# Patient Record
Sex: FEMALE | Race: WHITE | Employment: UNEMPLOYED | URBAN - METROPOLITAN AREA
[De-identification: names, ages, dates, MRNs, and addresses within clinical notes are randomized per-mention and may not be internally consistent; named-entity substitution may affect disease eponyms.]

---

## 2019-11-05 ENCOUNTER — HOSPITAL ENCOUNTER (OUTPATIENT)
Dept: PREADMISSION TESTING | Age: 59
Discharge: HOME | End: 2019-11-05
Payer: COMMERCIAL

## 2019-11-05 ENCOUNTER — OFFICE VISIT (OUTPATIENT)
Dept: ONCOLOGY | Age: 59
End: 2019-11-05

## 2019-11-05 VITALS
WEIGHT: 171 LBS | SYSTOLIC BLOOD PRESSURE: 130 MMHG | RESPIRATION RATE: 18 BRPM | HEART RATE: 79 BPM | TEMPERATURE: 96.6 F | OXYGEN SATURATION: 96 % | BODY MASS INDEX: 28.49 KG/M2 | HEIGHT: 65 IN | DIASTOLIC BLOOD PRESSURE: 79 MMHG

## 2019-11-05 DIAGNOSIS — R19.00 PELVIC MASS IN FEMALE: ICD-10-CM

## 2019-11-05 DIAGNOSIS — Z01.818 PREOP TESTING: ICD-10-CM

## 2019-11-05 DIAGNOSIS — Z01.818 PREOP TESTING: Primary | ICD-10-CM

## 2019-11-05 LAB
ATRIAL RATE: 82 BPM
CALCULATED P AXIS, ECG09: 47 DEGREES
CALCULATED R AXIS, ECG10: 55 DEGREES
CALCULATED T AXIS, ECG11: 65 DEGREES
DIAGNOSIS, 93000: NORMAL
P-R INTERVAL, ECG05: 162 MS
Q-T INTERVAL, ECG07: 384 MS
QRS DURATION, ECG06: 82 MS
QTC CALCULATION (BEZET), ECG08: 448 MS
VENTRICULAR RATE, ECG03: 82 BPM
XX-LABCORP SPECIMEN COL,LCBCF: NORMAL

## 2019-11-05 PROCEDURE — 86920 COMPATIBILITY TEST SPIN: CPT

## 2019-11-05 PROCEDURE — 86900 BLOOD TYPING SEROLOGIC ABO: CPT

## 2019-11-05 PROCEDURE — 86922 COMPATIBILITY TEST ANTIGLOB: CPT

## 2019-11-05 PROCEDURE — 99001 SPECIMEN HANDLING PT-LAB: CPT

## 2019-11-05 PROCEDURE — 86870 RBC ANTIBODY IDENTIFICATION: CPT

## 2019-11-05 PROCEDURE — 93005 ELECTROCARDIOGRAM TRACING: CPT

## 2019-11-05 PROCEDURE — 86921 COMPATIBILITY TEST INCUBATE: CPT

## 2019-11-05 RX ORDER — CHOLECALCIFEROL TAB 125 MCG (5000 UNIT) 125 MCG
TAB ORAL
COMMUNITY
Start: 2013-03-04

## 2019-11-05 NOTE — PROGRESS NOTES
Shanae Ding is a 62 y.o. female presents in office for    Chief Complaint   Patient presents with    New Patient    Ovarian Cyst     right ovary        Do you have any unusual vaginal bleeding, discharge or irritation? no  Do you have any changes in your bowel movements? no  Have you been experiencing nausea or vomiting? no  Have you been experiencing any continuous or worsening abdominal pain? no  Any urinary burning? no    Visit Vitals  /79 (BP 1 Location: Right arm, BP Patient Position: Sitting)   Pulse 79   Temp 96.6 °F (35.9 °C) (Oral)   Resp 18   Ht 5' 5\" (1.651 m)   Wt 77.6 kg (171 lb)   SpO2 96%   BMI 28.46 kg/m²         Health Maintenance Due   Topic Date Due    Hepatitis C Screening  1960    DTaP/Tdap/Td series (1 - Tdap) 05/11/1979    PAP AKA CERVICAL CYTOLOGY  05/11/1979    Shingrix Vaccine Age 50> (1 of 2) 05/11/2008    BREAST CANCER SCRN MAMMOGRAM  05/11/2008    FOBT Q 1 YEAR AGE 50-75  05/11/2008    Influenza Age 9 to Adult  08/01/2019         1. Have you been to the ER, urgent care clinic since your last visit? Hospitalized since your last visit? No    2. Have you seen or consulted any other health care providers outside of the [address] [place] [address] since your last visit? Include any pap smears or colon screening. Dr. Moisés Forrest (PCP)    3 most recent PHQ Screens 11/5/2019   Little interest or pleasure in doing things Not at all   Feeling down, depressed, irritable, or hopeless Not at all   Total Score PHQ 2 0       Abuse Screening Questionnaire 11/5/2019   Do you ever feel afraid of your partner? N   Are you in a relationship with someone who physically or mentally threatens you? N   Is it safe for you to go home? Y       Fall Risk Assessment, last 12 mths 11/5/2019   Able to walk? Yes   Fall in past 12 months?  No       Learning Assessment 11/5/2019   PRIMARY LEARNER Patient   HIGHEST LEVEL OF EDUCATION - PRIMARY LEARNER  4 YEARS OF COLLEGE   BARRIERS PRIMARY LEARNER NONE   CO-LEARNER CAREGIVER No   PRIMARY LANGUAGE ENGLISH   LEARNER PREFERENCE PRIMARY DEMONSTRATION     READING     LISTENING   ANSWERED BY patient   RELATIONSHIP SELF

## 2019-11-05 NOTE — PROGRESS NOTES
[facility]  [address] [place] [address], [address], [address] [facility]  [address] [facility], [place], [address] [place] [address]  [place], [address]   [phone]  Av Hernandez DO      Patient ID:  Name:  Ev Jacob  MRN:  [id number]  :  1960/59 y.o. Date:  2019      HISTORY OF PRESENT ILLNESS:  Ev Jacob is a 62 y.o.  postmenopausal female referred by Dr. Rachel Ayala for right ovarian mass. CT initially performed for LLQ which she had been having since September. After CT was sent for ultrasound. She is still having left side abdominal pain she describes as menstrual cramps radiating to back. Denies vaginal bleeding. No change in bladder/bowel habits    Labs:  none      Imaging    Pelvic Usn  Findings:    Transabdominal portion:    The uterus was anteverted. The right ovary is likely associated with a large cystic structure. The left ovary was nonvisualized. Transvaginal imaging was then performed to better evaluate both the uterus and adnexal structures. Transvaginal portion:    Uterus: Anteverted. 7.4 x 2.6 x 4.6 cm. Homogeneous myometrium. A hypodense lesion, likely a fibroid, is noted measuring 2.0 x 1.8 x 2.2 cm    Endometrial thickness: 3 mm. Right ovary: 13.1 x 11.4 x 10.2 cm for a calculated volume of 802 mL. Color and spectral doppler analysis reveals normal arterial and venous waveforms. There is a large mildly complex cystic-appearing lesion containing debris measuring 11.0 x 10.7 x 10.6 cm. Left ovary: Not visualized. Adnexal masses: None. Fluid: Physiologic. IMPRESSION    1.  Large mildly complex cystic-appearing lesion in the right adnexa, favored to be associated with the right ovary. Recommend GYN surgical consultation. 2. Uterine fibroid. CT 10/28/2019  Findings:    Lower thorax: Unremarkable    ABDOMEN:     Liver: The liver is not cirrhotic.  No worrisome masses are seen.  The hepatic veins, portal veins, SMV and splenic vein are patent. Gallbladder / biliary: Gallbladder is surgically absent. Pancreas:Normal.    Spleen: The spleen is normal.    Adrenal glands:Normal.    Nadyne Dunk kidneys are orthotopic. No collecting system dilation, high density calculus or large, mass is seen. Lymph nodes: Normal.    Retroperitoneum:No lymph node enlargement or mass. Aorta / IVC:The aorta is normal caliber. Gastrointestinal tract: There is diffuse mucosal thickening primarily involving the greater curvature of the stomach. There is some mucosal thickening is also noted at the antrum and proximal segment of the duodenum. Patient may benefit from endoscopy. The small bowel is optimally opacified with contrast showing normal caliber and valvulae conniventes days. The large bowel is predominantly decompressed and unopacified with oral contrast. There is suggestion of distal colonic bowel wall thickening primarily in the sigmoid segment. . Overall findings are nonspecific however may represent colitis or inflammatory bowel disease. Patient may benefit from GI consultation. Appendix:The appendix is surgically absent. Peritoneum:No free air. Fluid:None    Abdominal wall: Intact. PELVIS:   Bladder: The bladder is normal.    Pelvic reproductive organs:A 13.8 x 13.3 x 10.6 cm well-circumscribed cystic mass directly abuts the superior aspect of the uterus and adnexa. This is likely of ovarian origin however cannot be clearly identified to originate with either the right or left adnexa. Hounsfield units measure approximately 9. There is some wall thickening. Recommend dedicated transvaginal/transabdominal pelvic ultrasound for further assessment. This cystic mass directly effaces the uterus and has mass effect on the adjacent urinary bladder. Delayed imaging demonstrated appropriate excretion of iodinated contrast within the collecting system and urinary bladder.     A well-circumscribed 2.0 x 0.8 cm hyperdense mass is located in the anterior uterine body likely representing a uterine leiomyoma. Patient is status post bilateral tubal ligation clips. .    Lymph nodes: No lymph node enlargement. Fluid:None. Inguinal region: No lymphadenopathy    Musculoskeletal: No lytic or blastic lesion. Other Result Information   Interface, [employer] - 10/28/2019  2:53 PM EDT  Indication: R10.32: Abdominal pain, LLQ. Comparison: None available. Technique: Thin helical images were obtained from the lung bases through the pelvis. Reconstructions in the coronal and sagittal plane were done as well. Automated exposure control adjustment of mA and KV according to patient size were used for this exam.     Contrast: 100 cc IV Omnipaque    Findings:    Lower thorax: Unremarkable    ABDOMEN:     Liver: The liver is not cirrhotic. No worrisome masses are seen. The hepatic veins, portal veins, SMV and splenic vein are patent. Gallbladder / biliary: Gallbladder is surgically absent. Pancreas:Normal.    Spleen: The spleen is normal.    Adrenal glands:Normal.    Cleatrice Castleman kidneys are orthotopic. No collecting system dilation, high density calculus or large, mass is seen. Lymph nodes: Normal.    Retroperitoneum:No lymph node enlargement or mass. Aorta / IVC:The aorta is normal caliber. Gastrointestinal tract: There is diffuse mucosal thickening primarily involving the greater curvature of the stomach. There is some mucosal thickening is also noted at the antrum and proximal segment of the duodenum. Patient may benefit from endoscopy. The small bowel is optimally opacified with contrast showing normal caliber and valvulae conniventes days. The large bowel is predominantly decompressed and unopacified with oral contrast. There is suggestion of distal colonic bowel wall thickening primarily in the sigmoid segment. . Overall findings are nonspecific however may represent colitis or inflammatory bowel disease. Patient may benefit from GI consultation. Appendix:The appendix is surgically absent. Peritoneum:No free air. Fluid:None    Abdominal wall: Intact. PELVIS:   Bladder: The bladder is normal.    Pelvic reproductive organs:A 13.8 x 13.3 x 10.6 cm well-circumscribed cystic mass directly abuts the superior aspect of the uterus and adnexa. This is likely of ovarian origin however cannot be clearly identified to originate with either the right or left adnexa. Hounsfield units measure approximately 9. There is some wall thickening. Recommend dedicated transvaginal/transabdominal pelvic ultrasound for further assessment. This cystic mass directly effaces the uterus and has mass effect on the adjacent urinary bladder. Delayed imaging demonstrated appropriate excretion of iodinated contrast within the collecting system and urinary bladder. A well-circumscribed 2.0 x 0.8 cm hyperdense mass is located in the anterior uterine body likely representing a uterine leiomyoma. Patient is status post bilateral tubal ligation clips. .    Lymph nodes: No lymph node enlargement. Fluid:None. Inguinal region: No lymphadenopathy    Musculoskeletal: No lytic or blastic lesion. IMPRESSION  1. 13.8 x 13.3 x 10.6 cm well-circumscribed cystic mass within the pelvis, highly suspect for ovarian origin. This mass causes direct effacement of the adjacent uterus and subsequent urinary bladder. Transvaginal/transabdominal pelvic ultrasound recommended. 2. Gastric wall mucosal thickening primarily along the greater curvature and to lesser degree antrum and first part of the duodenum. Endoscopy and/or GI consult recommended. 3. Possible colonic mucosal wall thickening specifically within the sigmoid colon. Patient may benefit from endoscopy. Findings may represent nonspecific colitis or inflammatory bowel disease. There is no perforation or bowel obstruction identified.     Signed By: Yolanda Burgess MD on 10/28/2019 2:51 PM            ROS:   As above      There are no active problems to display for this patient. Past Medical History:   Diagnosis Date    Diabetes [facility])       Past Surgical History:   Procedure Laterality Date    HX APPENDECTOMY      HX CHOLECYSTECTOMY      HX TUBAL LIGATION        OB History    None       Social History     Tobacco Use    Smoking status: Current Every Day Smoker    Smokeless tobacco: Never Used   Substance Use Topics    Alcohol use: Not Currently      Family History   Problem Relation Age of Onset    Macular Degen Mother     Thyroid Disease Mother     Other Father         suspected mesothelioma      Current Outpatient Medications   Medication Sig    SITagliptin (JANUVIA) 100 mg tablet Take 100 mg by mouth.  mv-mn/folic ac/calcium/vit K1 (WOMEN'S 50 PLUS MULTIVITAMIN PO) Take  by mouth.  cholecalciferol, VITAMIN D3, (VITAMIN D3) 5,000 unit tab tablet VITAMIN D 5000 UNIT TABS (CHOLECALCIFEROL)     No current facility-administered medications for this visit. Allergies   Allergen Reactions    Codeine Other (comments)    Statins-Hmg-Coa Reductase Inhibitors Other (comments)     Muscle Pain          OBJECTIVE:    Physical Exam  VITAL SIGNS: Visit Vitals  /79 (BP 1 Location: Right arm, BP Patient Position: Sitting)   Pulse 79   Temp 96.6 °F (35.9 °C) (Oral)   Resp 18   Ht 5' 5\" (1.651 m)   Wt 77.6 kg (171 lb)   SpO2 96%   BMI 28.46 kg/m²      GENERAL SHILPI: in no apparent distress and well developed and well nourished   MUSCULOSKEL: no joint tenderness, deformity or swelling   INTEGUMENT:  warm and dry, no rashes or lesions   ABDOMEN . soft, NT, ND, mass palpable in midline   EXTREMITIES: extremities normal, atraumatic, no cyanosis or edema   PELVIC: External genitalia: normal general appearance  Vaginal: normal mucosa without prolapse or lesions  Cervix: normal appearance  Adnexa: enlarged adnexa, left, mobile, no nodularity  Uterus: normal single, nontender   RECTAL: deferred RICHARD SURVEY: Cervical, supraclavicular, axillary and inguinal nodes normal.   NEURO: Grossly normal         IMPRESSION/PLAN:  1. Pelvic mass   -reviewed her imaging and explained likely benign, however, would recommend surgical evaluaiton   -will check    -discussed plan for resection of mass/BSO, hysterectomy with intraop path and staging if malignant   -discussed robotic approach   -Risks, benefits and alternatives of surgery discussed in detail.  Risks including bleeding, infection, blood clot, injury to nearby organs including bladder, bowel, ureters and blood vessels.    -all of ms. garcia's questions/concerns addressed      The total time spent was 60 minutes regarding this patients diagnosis of pelvic mass and >50% of this time was spent counseling and coordinating care    [address] [place] [address]  Gynecologic Oncology  11/5/20191:53 PM

## 2019-11-05 NOTE — PROGRESS NOTES
[address], [address] [facility] Taylor Perez, [address] [facility] 
[address] [place] [address], [address] [place], [address] 
 [phone] Jess Syed DO Patient ID: 
Name:  Cuauhtemoc Calderon MRN:  [id number] :  59 y.o. Date:  2019 HISTORY OF PRESENT ILLNESS: 
Cuauhtemoc Calderon is a 62 y.o. G{NUMBERS 1-10:09842}P{NUMBERS 1-10:80282} {race/ethnicity:46038} {Menopause:25418} female referred by *** for ***. Labs: 
*** Imaging *** 
 
  
ROS:  
As above There are no active problems to display for this patient. Past Medical History:  
Diagnosis Date  Diabetes ([address] Past Surgical History:  
Procedure Laterality Date  HX APPENDECTOMY  HX CHOLECYSTECTOMY  HX TUBAL LIGATION    
  
OB History None Social History Tobacco Use  Smoking status: Current Every Day Smoker  Smokeless tobacco: Never Used Substance Use Topics  Alcohol use: Not Currently Family History Problem Relation Age of Onset  Macular Degen Mother  Thyroid Disease Mother  Other Father   
     suspected mesothelioma Current Outpatient Medications Medication Sig  
 SITagliptin (JANUVIA) 100 mg tablet Take 100 mg by mouth.  mv-mn/folic ac/calcium/vit K1 (WOMEN'S 50 PLUS MULTIVITAMIN PO) Take  by mouth.  cholecalciferol, VITAMIN D3, (VITAMIN D3) 5,000 unit tab tablet VITAMIN D 5000 UNIT TABS (CHOLECALCIFEROL) No current facility-administered medications for this visit. Allergies Allergen Reactions  Codeine Other (comments)  Statins-Hmg-Coa Reductase Inhibitors Other (comments) Muscle Pain OBJECTIVE: 
 
Physical Exam 
VITAL SIGNS: Visit Vitals /79 (BP 1 Location: Right arm, BP Patient Position: Sitting) Pulse 79 Temp 96.6 °F (35.9 °C) (Oral) Resp 18 Ht 5' 5\" (1.651 m) Wt 77.6 kg (171 lb) SpO2 96% BMI 28.46 kg/m² GENERAL SHILPI: in no apparent distress and well developed and well nourished MUSCULOSKEL: no joint tenderness, deformity or swelling INTEGUMENT:  warm and dry, no rashes or lesions ABDOMEN . soft, NT, ND, No masses appreciated EXTREMITIES: extremities normal, atraumatic, no cyanosis or edema PELVIC: {Exam; pelvic:92211} RECTAL: deferred RICHARD SURVEY: Cervical, supraclavicular, axillary and inguinal nodes normal.  
NEURO: Grossly normal  
 
 
 
IMPRESSION/PLAN: 
 
 
The total time spent was 60 minutes regarding this patients diagnosis of *** and >50% of this time was spent counseling and coordinating care Lidya Watkins DO Gynecologic Oncology 11/5/20191:54 PM

## 2019-11-05 NOTE — PATIENT INSTRUCTIONS

## 2019-11-05 NOTE — H&P (VIEW-ONLY)
[address], [address], [address] [facility] 
[address] [facility], [address] [place], [address] 
 [phone] Jake Townsend DO Patient ID: 
Name:  Roscoe Garg MRN:  [id number] :  1960/59 y.o. Date:  2019 HISTORY OF PRESENT ILLNESS: 
Roscoe Garg is a 62 y.o.  postmenopausal female referred by Dr. Susan Gonsales for right ovarian mass. CT initially performed for LLQ which she had been having since September. After CT was sent for ultrasound. She is still having left side abdominal pain she describes as menstrual cramps radiating to back. Denies vaginal bleeding. No change in bladder/bowel habits Labs: 
none Imaging Pelvic Usn Findings: 
 
Transabdominal portion: The uterus was anteverted. The right ovary is likely associated with a large cystic structure. The left ovary was nonvisualized. Transvaginal imaging was then performed to better evaluate both the uterus and adnexal structures. Transvaginal portion: 
 
Uterus: Anteverted. 7.4 x 2.6 x 4.6 cm. Homogeneous myometrium. A hypodense lesion, likely a fibroid, is noted measuring 2.0 x 1.8 x 2.2 cm Endometrial thickness: 3 mm. Right ovary: 13.1 x 11.4 x 10.2 cm for a calculated volume of 802 mL. Color and spectral doppler analysis reveals normal arterial and venous waveforms. There is a large mildly complex cystic-appearing lesion containing debris measuring 11.0 x 10.7 x 10.6 cm. Left ovary: Not visualized. Adnexal masses: None. Fluid: Physiologic. IMPRESSION 1.  Large mildly complex cystic-appearing lesion in the right adnexa, favored to be associated with the right ovary. Recommend GYN surgical consultation. 2. Uterine fibroid. CT 10/28/2019 Findings: 
 
Lower thorax: Unremarkable ABDOMEN:  
 
Liver: The liver is not cirrhotic.  No worrisome masses are seen.  The hepatic veins, portal veins, SMV and splenic vein are patent. Gallbladder / biliary: Gallbladder is surgically absent. Pancreas:Normal. 
 
Spleen: The spleen is normal. 
 
Adrenal glands:Normal. 
 
Madina Marianela kidneys are orthotopic. No collecting system dilation, high density calculus or large, mass is seen. Lymph nodes: Normal. 
 
Retroperitoneum:No lymph node enlargement or mass. Aorta / IVC:The aorta is normal caliber. Gastrointestinal tract: There is diffuse mucosal thickening primarily involving the greater curvature of the stomach. There is some mucosal thickening is also noted at the antrum and proximal segment of the duodenum. Patient may benefit from endoscopy. The small bowel is optimally opacified with contrast showing normal caliber and valvulae conniventes days. The large bowel is predominantly decompressed and unopacified with oral contrast. There is suggestion of distal colonic bowel wall thickening primarily in the sigmoid segment. . Overall findings are nonspecific however may represent colitis or inflammatory bowel disease. Patient may benefit from GI consultation. Appendix:The appendix is surgically absent. Peritoneum:No free air. Fluid:None Abdominal wall: Intact. PELVIS:  
Bladder: The bladder is normal. 
 
Pelvic reproductive organs:A 13.8 x 13.3 x 10.6 cm well-circumscribed cystic mass directly abuts the superior aspect of the uterus and adnexa. This is likely of ovarian origin however cannot be clearly identified to originate with either the right or left adnexa. Hounsfield units measure approximately 9. There is some wall thickening. Recommend dedicated transvaginal/transabdominal pelvic ultrasound for further assessment. This cystic mass directly effaces the uterus and has mass effect on the adjacent urinary bladder. Delayed imaging demonstrated appropriate excretion of iodinated contrast within the collecting system and urinary bladder. A well-circumscribed 2.0 x 0.8 cm hyperdense mass is located in the anterior uterine body likely representing a uterine leiomyoma. Patient is status post bilateral tubal ligation clips. Ramone Ashley Lymph nodes: No lymph node enlargement. Fluid:None. Inguinal region: No lymphadenopathy Musculoskeletal: No lytic or blastic lesion. Other Result Information Interface, [facility]is [facility] - 10/28/2019  2:53 PM EDT Indication: R10.32: Abdominal pain, LLQ. Comparison: None available. Technique: Thin helical images were obtained from the lung bases through the pelvis. Reconstructions in the coronal and sagittal plane were done as well. Automated exposure control adjustment of mA and KV according to patient size were used for this exam.  
 
Contrast: 100 cc IV Omnipaque Findings: 
 
Lower thorax: Unremarkable ABDOMEN:  
 
Liver: The liver is not cirrhotic. No worrisome masses are seen. The hepatic veins, portal veins, SMV and splenic vein are patent. Gallbladder / biliary: Gallbladder is surgically absent. Pancreas:Normal. 
 
Spleen: The spleen is normal. 
 
Adrenal glands:Normal. 
 
Nicholas Garrett kidneys are orthotopic. No collecting system dilation, high density calculus or large, mass is seen. Lymph nodes: Normal. 
 
Retroperitoneum:No lymph node enlargement or mass. Aorta / IVC:The aorta is normal caliber. Gastrointestinal tract: There is diffuse mucosal thickening primarily involving the greater curvature of the stomach. There is some mucosal thickening is also noted at the antrum and proximal segment of the duodenum. Patient may benefit from endoscopy. The small bowel is optimally opacified with contrast showing normal caliber and valvulae conniventes days. The large bowel is predominantly decompressed and unopacified with oral contrast. There is suggestion of distal colonic bowel wall thickening primarily in the sigmoid segment. . Overall findings are nonspecific however may represent colitis or inflammatory bowel disease. Patient may benefit from GI consultation. Appendix:The appendix is surgically absent. Peritoneum:No free air. Fluid:None Abdominal wall: Intact. PELVIS:  
Bladder: The bladder is normal. 
 
Pelvic reproductive organs:A 13.8 x 13.3 x 10.6 cm well-circumscribed cystic mass directly abuts the superior aspect of the uterus and adnexa. This is likely of ovarian origin however cannot be clearly identified to originate with either the right or left adnexa. Hounsfield units measure approximately 9. There is some wall thickening. Recommend dedicated transvaginal/transabdominal pelvic ultrasound for further assessment. This cystic mass directly effaces the uterus and has mass effect on the adjacent urinary bladder. Delayed imaging demonstrated appropriate excretion of iodinated contrast within the collecting system and urinary bladder. A well-circumscribed 2.0 x 0.8 cm hyperdense mass is located in the anterior uterine body likely representing a uterine leiomyoma. Patient is status post bilateral tubal ligation clips. Oj Ask Lymph nodes: No lymph node enlargement. Fluid:None. Inguinal region: No lymphadenopathy Musculoskeletal: No lytic or blastic lesion. IMPRESSION 1. 13.8 x 13.3 x 10.6 cm well-circumscribed cystic mass within the pelvis, highly suspect for ovarian origin. This mass causes direct effacement of the adjacent uterus and subsequent urinary bladder. Transvaginal/transabdominal pelvic ultrasound recommended. 2. Gastric wall mucosal thickening primarily along the greater curvature and to lesser degree antrum and first part of the duodenum. Endoscopy and/or GI consult recommended. 3. Possible colonic mucosal wall thickening specifically within the sigmoid colon. Patient may benefit from endoscopy. Findings may represent nonspecific colitis or inflammatory bowel disease. There is no perforation or bowel obstruction identified. Signed By: Rudolph Burciaga MD on 10/28/2019 2:51 PM  
 
 
 
  
ROS:  
As above There are no active problems to display for this patient. Past Medical History:  
Diagnosis Date  Diabetes ([address] Past Surgical History:  
Procedure Laterality Date  HX APPENDECTOMY  HX CHOLECYSTECTOMY  HX TUBAL LIGATION    
  
OB History None Social History Tobacco Use  Smoking status: Current Every Day Smoker  Smokeless tobacco: Never Used Substance Use Topics  Alcohol use: Not Currently Family History Problem Relation Age of Onset  Macular Degen Mother  Thyroid Disease Mother  Other Father   
     suspected mesothelioma Current Outpatient Medications Medication Sig  
 SITagliptin (JANUVIA) 100 mg tablet Take 100 mg by mouth.  mv-mn/folic ac/calcium/vit K1 (WOMEN'S 50 PLUS MULTIVITAMIN PO) Take  by mouth.  cholecalciferol, VITAMIN D3, (VITAMIN D3) 5,000 unit tab tablet VITAMIN D 5000 UNIT TABS (CHOLECALCIFEROL) No current facility-administered medications for this visit. Allergies Allergen Reactions  Codeine Other (comments)  Statins-Hmg-Coa Reductase Inhibitors Other (comments) Muscle Pain OBJECTIVE: 
 
Physical Exam 
VITAL SIGNS: Visit Vitals /79 (BP 1 Location: Right arm, BP Patient Position: Sitting) Pulse 79 Temp 96.6 °F (35.9 °C) (Oral) Resp 18 Ht 5' 5\" (1.651 m) Wt 77.6 kg (171 lb) SpO2 96% BMI 28.46 kg/m² GENERAL SHILPI: in no apparent distress and well developed and well nourished MUSCULOSKEL: no joint tenderness, deformity or swelling INTEGUMENT:  warm and dry, no rashes or lesions ABDOMEN . soft, NT, ND, mass palpable in midline EXTREMITIES: extremities normal, atraumatic, no cyanosis or edema PELVIC: External genitalia: normal general appearance Vaginal: normal mucosa without prolapse or lesions Cervix: normal appearance Adnexa: enlarged adnexa, left, mobile, no nodularity Uterus: normal single, nontender RECTAL: deferred RICHARD SURVEY: Cervical, supraclavicular, axillary and inguinal nodes normal.  
NEURO: Grossly normal  
 
 
 
IMPRESSION/PLAN: 
1. Pelvic mass 
 -reviewed her imaging and explained likely benign, however, would recommend surgical evaluaiton 
 -will check  
 -discussed plan for resection of mass/BSO, hysterectomy with intraop path and staging if malignant 
 -discussed robotic approach 
 -Risks, benefits and alternatives of surgery discussed in detail. Risks including bleeding, infection, blood clot, injury to nearby organs including bladder, bowel, ureters and blood vessels.  
 -all of ms. garcia's questions/concerns addressed The total time spent was 60 minutes regarding this patients diagnosis of pelvic mass and >50% of this time was spent counseling and coordinating care Jackson Luong DO Gynecologic Oncology 11/5/20191:53 PM

## 2019-11-06 LAB
ALBUMIN SERPL-MCNC: 4.3 G/DL (ref 3.6–4.8)
ALBUMIN/GLOB SERPL: 1.7 {RATIO} (ref 1.2–2.2)
ALP SERPL-CCNC: 72 IU/L (ref 39–117)
ALT SERPL-CCNC: 16 IU/L (ref 0–32)
AST SERPL-CCNC: 17 IU/L (ref 0–40)
BASOPHILS # BLD AUTO: 0.1 X10E3/UL (ref 0–0.2)
BASOPHILS NFR BLD AUTO: 1 %
BILIRUB SERPL-MCNC: 0.3 MG/DL (ref 0–1.2)
BUN SERPL-MCNC: 8 MG/DL (ref 8–27)
BUN/CREAT SERPL: 11 (ref 12–28)
CALCIUM SERPL-MCNC: 9.9 MG/DL (ref 8.7–10.3)
CHLORIDE SERPL-SCNC: 100 MMOL/L (ref 96–106)
CO2 SERPL-SCNC: 25 MMOL/L (ref 20–29)
CREAT SERPL-MCNC: 0.7 MG/DL (ref 0.57–1)
EOSINOPHIL # BLD AUTO: 0.2 X10E3/UL (ref 0–0.4)
EOSINOPHIL NFR BLD AUTO: 2 %
ERYTHROCYTE [DISTWIDTH] IN BLOOD BY AUTOMATED COUNT: 12.9 % (ref 12.3–15.4)
GLOBULIN SER CALC-MCNC: 2.6 G/DL (ref 1.5–4.5)
GLUCOSE SERPL-MCNC: 158 MG/DL (ref 65–99)
HCT VFR BLD AUTO: 45.2 % (ref 34–46.6)
HGB BLD-MCNC: 15.6 G/DL (ref 11.1–15.9)
IMM GRANULOCYTES # BLD AUTO: 0 X10E3/UL (ref 0–0.1)
IMM GRANULOCYTES NFR BLD AUTO: 0 %
LYMPHOCYTES # BLD AUTO: 3.1 X10E3/UL (ref 0.7–3.1)
LYMPHOCYTES NFR BLD AUTO: 38 %
MCH RBC QN AUTO: 29.8 PG (ref 26.6–33)
MCHC RBC AUTO-ENTMCNC: 34.5 G/DL (ref 31.5–35.7)
MCV RBC AUTO: 86 FL (ref 79–97)
MONOCYTES # BLD AUTO: 0.6 X10E3/UL (ref 0.1–0.9)
MONOCYTES NFR BLD AUTO: 7 %
NEUTROPHILS # BLD AUTO: 4.2 X10E3/UL (ref 1.4–7)
NEUTROPHILS NFR BLD AUTO: 52 %
PLATELET # BLD AUTO: 304 X10E3/UL (ref 150–450)
POTASSIUM SERPL-SCNC: 4.6 MMOL/L (ref 3.5–5.2)
PROT SERPL-MCNC: 6.9 G/DL (ref 6–8.5)
RBC # BLD AUTO: 5.23 X10E6/UL (ref 3.77–5.28)
SODIUM SERPL-SCNC: 139 MMOL/L (ref 134–144)
SPECIMEN STATUS REPORT, ROLRST: NORMAL
WBC # BLD AUTO: 8.1 X10E3/UL (ref 3.4–10.8)

## 2019-11-08 ENCOUNTER — ANESTHESIA EVENT (OUTPATIENT)
Dept: SURGERY | Age: 59
End: 2019-11-08
Payer: COMMERCIAL

## 2019-11-11 ENCOUNTER — ANESTHESIA (OUTPATIENT)
Dept: SURGERY | Age: 59
End: 2019-11-11
Payer: COMMERCIAL

## 2019-11-11 ENCOUNTER — HOSPITAL ENCOUNTER (OUTPATIENT)
Age: 59
Setting detail: OBSERVATION
Discharge: HOME | End: 2019-11-12
Attending: OBSTETRICS & GYNECOLOGY | Admitting: OBSTETRICS & GYNECOLOGY
Payer: COMMERCIAL

## 2019-11-11 DIAGNOSIS — Z90.710 S/P HYSTERECTOMY: Primary | ICD-10-CM

## 2019-11-11 PROBLEM — R19.00 PELVIC MASS IN FEMALE: Status: ACTIVE | Noted: 2019-11-11

## 2019-11-11 LAB
GLUCOSE BLD STRIP.AUTO-MCNC: 163 MG/DL (ref 70–110)
GLUCOSE BLD STRIP.AUTO-MCNC: 181 MG/DL (ref 70–110)
GLUCOSE BLD STRIP.AUTO-MCNC: 239 MG/DL (ref 70–110)
GLUCOSE BLD STRIP.AUTO-MCNC: 242 MG/DL (ref 70–110)

## 2019-11-11 PROCEDURE — 74011000250 HC RX REV CODE- 250: Performed by: NURSE ANESTHETIST, CERTIFIED REGISTERED

## 2019-11-11 PROCEDURE — 88307 TISSUE EXAM BY PATHOLOGIST: CPT

## 2019-11-11 PROCEDURE — 77030008574 HC TBNG SUC IRR STRY -B: Performed by: OBSTETRICS & GYNECOLOGY

## 2019-11-11 PROCEDURE — 77030020782 HC GWN BAIR PAWS FLX 3M -B: Performed by: OBSTETRICS & GYNECOLOGY

## 2019-11-11 PROCEDURE — 76060000033 HC ANESTHESIA 1 TO 1.5 HR: Performed by: OBSTETRICS & GYNECOLOGY

## 2019-11-11 PROCEDURE — 77030035277 HC OBTRTR BLDELSS DISP INTU -B: Performed by: OBSTETRICS & GYNECOLOGY

## 2019-11-11 PROCEDURE — 77030014063 HC TIP MANIP UTER COOP -B: Performed by: OBSTETRICS & GYNECOLOGY

## 2019-11-11 PROCEDURE — 77030020255 HC SOL INJ LR 1000ML BG: Performed by: OBSTETRICS & GYNECOLOGY

## 2019-11-11 PROCEDURE — 77030020703 HC SEAL CANN DISP INTU -B: Performed by: OBSTETRICS & GYNECOLOGY

## 2019-11-11 PROCEDURE — 77030002966 HC SUT PDS J&J -A: Performed by: OBSTETRICS & GYNECOLOGY

## 2019-11-11 PROCEDURE — 99218 HC RM OBSERVATION: CPT

## 2019-11-11 PROCEDURE — 77030031492 HC PRT ACC BLNT AIRSEAL CNMD -B: Performed by: OBSTETRICS & GYNECOLOGY

## 2019-11-11 PROCEDURE — 77030002933 HC SUT MCRYL J&J -A: Performed by: OBSTETRICS & GYNECOLOGY

## 2019-11-11 PROCEDURE — 74011250637 HC RX REV CODE- 250/637: Performed by: NURSE ANESTHETIST, CERTIFIED REGISTERED

## 2019-11-11 PROCEDURE — 74011250636 HC RX REV CODE- 250/636: Performed by: OBSTETRICS & GYNECOLOGY

## 2019-11-11 PROCEDURE — 77030025805 HC MANIP UTER RUMI COOP -B: Performed by: OBSTETRICS & GYNECOLOGY

## 2019-11-11 PROCEDURE — 76210000063 HC OR PH I REC FIRST 0.5 HR: Performed by: OBSTETRICS & GYNECOLOGY

## 2019-11-11 PROCEDURE — 76010000934 HC OR TIME 1 TO 1.5HR INTENSV - TIER 2: Performed by: OBSTETRICS & GYNECOLOGY

## 2019-11-11 PROCEDURE — 74011250636 HC RX REV CODE- 250/636: Performed by: NURSE ANESTHETIST, CERTIFIED REGISTERED

## 2019-11-11 PROCEDURE — 77030018836 HC SOL IRR NACL ICUM -A: Performed by: OBSTETRICS & GYNECOLOGY

## 2019-11-11 PROCEDURE — 77030040830 HC CATH URETH FOL MDII -A: Performed by: OBSTETRICS & GYNECOLOGY

## 2019-11-11 PROCEDURE — 77030037892: Performed by: OBSTETRICS & GYNECOLOGY

## 2019-11-11 PROCEDURE — 77030016151 HC PROTCTR LNS DFOG COVD -B: Performed by: OBSTETRICS & GYNECOLOGY

## 2019-11-11 PROCEDURE — 74011636637 HC RX REV CODE- 636/637: Performed by: OBSTETRICS & GYNECOLOGY

## 2019-11-11 PROCEDURE — 88305 TISSUE EXAM BY PATHOLOGIST: CPT

## 2019-11-11 PROCEDURE — 82962 GLUCOSE BLOOD TEST: CPT

## 2019-11-11 PROCEDURE — 77030018823 HC SLV COMPR VENO -B: Performed by: OBSTETRICS & GYNECOLOGY

## 2019-11-11 PROCEDURE — 77010033678 HC OXYGEN DAILY

## 2019-11-11 PROCEDURE — 88112 CYTOPATH CELL ENHANCE TECH: CPT

## 2019-11-11 PROCEDURE — 77030040361 HC SLV COMPR DVT MDII -B: Performed by: OBSTETRICS & GYNECOLOGY

## 2019-11-11 PROCEDURE — 74011000250 HC RX REV CODE- 250: Performed by: OBSTETRICS & GYNECOLOGY

## 2019-11-11 PROCEDURE — 74011636637 HC RX REV CODE- 636/637: Performed by: SPECIALIST

## 2019-11-11 PROCEDURE — C9290 INJ, BUPIVACAINE LIPOSOME: HCPCS | Performed by: OBSTETRICS & GYNECOLOGY

## 2019-11-11 RX ORDER — MAGNESIUM SULFATE 100 %
4 CRYSTALS MISCELLANEOUS AS NEEDED
Status: DISCONTINUED | OUTPATIENT
Start: 2019-11-11 | End: 2019-11-12 | Stop reason: HOSPADM

## 2019-11-11 RX ORDER — FENTANYL CITRATE 50 UG/ML
25 INJECTION, SOLUTION INTRAMUSCULAR; INTRAVENOUS
Status: DISCONTINUED | OUTPATIENT
Start: 2019-11-11 | End: 2019-11-11 | Stop reason: HOSPADM

## 2019-11-11 RX ORDER — ONDANSETRON 2 MG/ML
INJECTION INTRAMUSCULAR; INTRAVENOUS AS NEEDED
Status: DISCONTINUED | OUTPATIENT
Start: 2019-11-11 | End: 2019-11-11 | Stop reason: HOSPADM

## 2019-11-11 RX ORDER — SODIUM CHLORIDE 0.9 % (FLUSH) 0.9 %
5-40 SYRINGE (ML) INJECTION AS NEEDED
Status: DISCONTINUED | OUTPATIENT
Start: 2019-11-11 | End: 2019-11-12 | Stop reason: HOSPADM

## 2019-11-11 RX ORDER — SODIUM CHLORIDE, SODIUM LACTATE, POTASSIUM CHLORIDE, CALCIUM CHLORIDE 600; 310; 30; 20 MG/100ML; MG/100ML; MG/100ML; MG/100ML
INJECTION, SOLUTION INTRAVENOUS
Status: DISCONTINUED | OUTPATIENT
Start: 2019-11-11 | End: 2019-11-11 | Stop reason: HOSPADM

## 2019-11-11 RX ORDER — ONDANSETRON 2 MG/ML
4 INJECTION INTRAMUSCULAR; INTRAVENOUS
Status: DISCONTINUED | OUTPATIENT
Start: 2019-11-11 | End: 2019-11-12 | Stop reason: HOSPADM

## 2019-11-11 RX ORDER — NALOXONE HYDROCHLORIDE 0.4 MG/ML
0.2 INJECTION, SOLUTION INTRAMUSCULAR; INTRAVENOUS; SUBCUTANEOUS AS NEEDED
Status: DISCONTINUED | OUTPATIENT
Start: 2019-11-11 | End: 2019-11-11 | Stop reason: HOSPADM

## 2019-11-11 RX ORDER — INSULIN LISPRO 100 [IU]/ML
INJECTION, SOLUTION INTRAVENOUS; SUBCUTANEOUS ONCE
Status: COMPLETED | OUTPATIENT
Start: 2019-11-11 | End: 2019-11-11

## 2019-11-11 RX ORDER — NEOSTIGMINE METHYLSULFATE 1 MG/ML
INJECTION, SOLUTION INTRAVENOUS AS NEEDED
Status: DISCONTINUED | OUTPATIENT
Start: 2019-11-11 | End: 2019-11-11 | Stop reason: HOSPADM

## 2019-11-11 RX ORDER — ALBUTEROL SULFATE 90 UG/1
AEROSOL, METERED RESPIRATORY (INHALATION) AS NEEDED
Status: DISCONTINUED | OUTPATIENT
Start: 2019-11-11 | End: 2019-11-11 | Stop reason: HOSPADM

## 2019-11-11 RX ORDER — SODIUM CHLORIDE 0.9 % (FLUSH) 0.9 %
5-40 SYRINGE (ML) INJECTION EVERY 8 HOURS
Status: DISCONTINUED | OUTPATIENT
Start: 2019-11-11 | End: 2019-11-11 | Stop reason: HOSPADM

## 2019-11-11 RX ORDER — PROPOFOL 10 MG/ML
INJECTION, EMULSION INTRAVENOUS AS NEEDED
Status: DISCONTINUED | OUTPATIENT
Start: 2019-11-11 | End: 2019-11-11 | Stop reason: HOSPADM

## 2019-11-11 RX ORDER — SODIUM CHLORIDE 0.9 % (FLUSH) 0.9 %
5-40 SYRINGE (ML) INJECTION AS NEEDED
Status: DISCONTINUED | OUTPATIENT
Start: 2019-11-11 | End: 2019-11-11 | Stop reason: HOSPADM

## 2019-11-11 RX ORDER — FENTANYL CITRATE 50 UG/ML
25 INJECTION, SOLUTION INTRAMUSCULAR; INTRAVENOUS AS NEEDED
Status: DISCONTINUED | OUTPATIENT
Start: 2019-11-11 | End: 2019-11-11 | Stop reason: HOSPADM

## 2019-11-11 RX ORDER — FENTANYL CITRATE 50 UG/ML
INJECTION, SOLUTION INTRAMUSCULAR; INTRAVENOUS AS NEEDED
Status: DISCONTINUED | OUTPATIENT
Start: 2019-11-11 | End: 2019-11-11 | Stop reason: HOSPADM

## 2019-11-11 RX ORDER — HYDROMORPHONE HYDROCHLORIDE 2 MG/ML
INJECTION, SOLUTION INTRAMUSCULAR; INTRAVENOUS; SUBCUTANEOUS AS NEEDED
Status: DISCONTINUED | OUTPATIENT
Start: 2019-11-11 | End: 2019-11-11 | Stop reason: HOSPADM

## 2019-11-11 RX ORDER — CEFAZOLIN SODIUM/WATER 2 G/20 ML
2 SYRINGE (ML) INTRAVENOUS EVERY 8 HOURS
Status: COMPLETED | OUTPATIENT
Start: 2019-11-11 | End: 2019-11-12

## 2019-11-11 RX ORDER — ACETAMINOPHEN 10 MG/ML
INJECTION, SOLUTION INTRAVENOUS AS NEEDED
Status: DISCONTINUED | OUTPATIENT
Start: 2019-11-11 | End: 2019-11-11 | Stop reason: HOSPADM

## 2019-11-11 RX ORDER — GLYCOPYRROLATE 0.2 MG/ML
INJECTION INTRAMUSCULAR; INTRAVENOUS AS NEEDED
Status: DISCONTINUED | OUTPATIENT
Start: 2019-11-11 | End: 2019-11-11 | Stop reason: HOSPADM

## 2019-11-11 RX ORDER — KETOROLAC TROMETHAMINE 15 MG/ML
15 INJECTION, SOLUTION INTRAMUSCULAR; INTRAVENOUS
Status: DISCONTINUED | OUTPATIENT
Start: 2019-11-11 | End: 2019-11-12 | Stop reason: HOSPADM

## 2019-11-11 RX ORDER — LIDOCAINE HYDROCHLORIDE 20 MG/ML
INJECTION, SOLUTION EPIDURAL; INFILTRATION; INTRACAUDAL; PERINEURAL AS NEEDED
Status: DISCONTINUED | OUTPATIENT
Start: 2019-11-11 | End: 2019-11-11 | Stop reason: HOSPADM

## 2019-11-11 RX ORDER — MIDAZOLAM HYDROCHLORIDE 1 MG/ML
INJECTION, SOLUTION INTRAMUSCULAR; INTRAVENOUS AS NEEDED
Status: DISCONTINUED | OUTPATIENT
Start: 2019-11-11 | End: 2019-11-11 | Stop reason: HOSPADM

## 2019-11-11 RX ORDER — SODIUM CHLORIDE, SODIUM LACTATE, POTASSIUM CHLORIDE, CALCIUM CHLORIDE 600; 310; 30; 20 MG/100ML; MG/100ML; MG/100ML; MG/100ML
125 INJECTION, SOLUTION INTRAVENOUS CONTINUOUS
Status: DISCONTINUED | OUTPATIENT
Start: 2019-11-11 | End: 2019-11-12 | Stop reason: HOSPADM

## 2019-11-11 RX ORDER — SODIUM CHLORIDE, SODIUM LACTATE, POTASSIUM CHLORIDE, CALCIUM CHLORIDE 600; 310; 30; 20 MG/100ML; MG/100ML; MG/100ML; MG/100ML
100 INJECTION, SOLUTION INTRAVENOUS CONTINUOUS
Status: DISCONTINUED | OUTPATIENT
Start: 2019-11-11 | End: 2019-11-11 | Stop reason: HOSPADM

## 2019-11-11 RX ORDER — ENOXAPARIN SODIUM 100 MG/ML
40 INJECTION SUBCUTANEOUS EVERY 24 HOURS
Status: DISCONTINUED | OUTPATIENT
Start: 2019-11-12 | End: 2019-11-12 | Stop reason: HOSPADM

## 2019-11-11 RX ORDER — DEXTROSE MONOHYDRATE 100 MG/ML
125-250 INJECTION, SOLUTION INTRAVENOUS AS NEEDED
Status: DISCONTINUED | OUTPATIENT
Start: 2019-11-11 | End: 2019-11-12 | Stop reason: HOSPADM

## 2019-11-11 RX ORDER — ROCURONIUM BROMIDE 10 MG/ML
INJECTION, SOLUTION INTRAVENOUS AS NEEDED
Status: DISCONTINUED | OUTPATIENT
Start: 2019-11-11 | End: 2019-11-11 | Stop reason: HOSPADM

## 2019-11-11 RX ORDER — INSULIN LISPRO 100 [IU]/ML
INJECTION, SOLUTION INTRAVENOUS; SUBCUTANEOUS
Status: DISCONTINUED | OUTPATIENT
Start: 2019-11-11 | End: 2019-11-12 | Stop reason: HOSPADM

## 2019-11-11 RX ORDER — OXYCODONE AND ACETAMINOPHEN 5; 325 MG/1; MG/1
2 TABLET ORAL
Status: DISCONTINUED | OUTPATIENT
Start: 2019-11-11 | End: 2019-11-12 | Stop reason: HOSPADM

## 2019-11-11 RX ORDER — SODIUM CHLORIDE 0.9 % (FLUSH) 0.9 %
5-40 SYRINGE (ML) INJECTION EVERY 8 HOURS
Status: DISCONTINUED | OUTPATIENT
Start: 2019-11-11 | End: 2019-11-12 | Stop reason: HOSPADM

## 2019-11-11 RX ORDER — OXYCODONE AND ACETAMINOPHEN 5; 325 MG/1; MG/1
1 TABLET ORAL
Status: DISCONTINUED | OUTPATIENT
Start: 2019-11-11 | End: 2019-11-12 | Stop reason: HOSPADM

## 2019-11-11 RX ORDER — MAGNESIUM SULFATE 100 %
4 CRYSTALS MISCELLANEOUS AS NEEDED
Status: DISCONTINUED | OUTPATIENT
Start: 2019-11-11 | End: 2019-11-11

## 2019-11-11 RX ORDER — DIPHENHYDRAMINE HCL 25 MG
25 CAPSULE ORAL
Status: DISCONTINUED | OUTPATIENT
Start: 2019-11-11 | End: 2019-11-12 | Stop reason: HOSPADM

## 2019-11-11 RX ORDER — NALOXONE HYDROCHLORIDE 0.4 MG/ML
0.4 INJECTION, SOLUTION INTRAMUSCULAR; INTRAVENOUS; SUBCUTANEOUS AS NEEDED
Status: DISCONTINUED | OUTPATIENT
Start: 2019-11-11 | End: 2019-11-12 | Stop reason: HOSPADM

## 2019-11-11 RX ORDER — DEXTROSE MONOHYDRATE 100 MG/ML
125-250 INJECTION, SOLUTION INTRAVENOUS AS NEEDED
Status: DISCONTINUED | OUTPATIENT
Start: 2019-11-11 | End: 2019-11-11

## 2019-11-11 RX ORDER — CEFAZOLIN SODIUM/WATER 2 G/20 ML
2 SYRINGE (ML) INTRAVENOUS ONCE
Status: COMPLETED | OUTPATIENT
Start: 2019-11-11 | End: 2019-11-11

## 2019-11-11 RX ADMIN — ACETAMINOPHEN 1000 MG: 10 INJECTION, SOLUTION INTRAVENOUS at 12:05

## 2019-11-11 RX ADMIN — ALBUTEROL SULFATE 3 PUFF: 90 AEROSOL, METERED RESPIRATORY (INHALATION) at 13:06

## 2019-11-11 RX ADMIN — FENTANYL CITRATE 75 MCG: 50 INJECTION, SOLUTION INTRAMUSCULAR; INTRAVENOUS at 11:57

## 2019-11-11 RX ADMIN — INSULIN LISPRO 4 UNITS: 100 INJECTION, SOLUTION INTRAVENOUS; SUBCUTANEOUS at 16:27

## 2019-11-11 RX ADMIN — SODIUM CHLORIDE, SODIUM LACTATE, POTASSIUM CHLORIDE, AND CALCIUM CHLORIDE 125 ML/HR: 600; 310; 30; 20 INJECTION, SOLUTION INTRAVENOUS at 10:29

## 2019-11-11 RX ADMIN — SODIUM CHLORIDE, SODIUM LACTATE, POTASSIUM CHLORIDE, AND CALCIUM CHLORIDE: 600; 310; 30; 20 INJECTION, SOLUTION INTRAVENOUS at 11:40

## 2019-11-11 RX ADMIN — Medication 1 MG: at 13:08

## 2019-11-11 RX ADMIN — Medication 2 G: at 20:14

## 2019-11-11 RX ADMIN — Medication 50 MG: at 11:59

## 2019-11-11 RX ADMIN — PROPOFOL 160 MG: 10 INJECTION, EMULSION INTRAVENOUS at 11:59

## 2019-11-11 RX ADMIN — Medication 2 MG: at 13:06

## 2019-11-11 RX ADMIN — INSULIN LISPRO 3 UNITS: 100 INJECTION, SOLUTION INTRAVENOUS; SUBCUTANEOUS at 10:27

## 2019-11-11 RX ADMIN — Medication 2 G: at 12:04

## 2019-11-11 RX ADMIN — Medication 10 ML: at 16:28

## 2019-11-11 RX ADMIN — FENTANYL CITRATE 25 MCG: 50 INJECTION, SOLUTION INTRAMUSCULAR; INTRAVENOUS at 12:16

## 2019-11-11 RX ADMIN — ONDANSETRON HYDROCHLORIDE 4 MG: 2 INJECTION INTRAMUSCULAR; INTRAVENOUS at 12:56

## 2019-11-11 RX ADMIN — LIDOCAINE HYDROCHLORIDE 80 MG: 20 INJECTION, SOLUTION EPIDURAL; INFILTRATION; INTRACAUDAL; PERINEURAL at 11:59

## 2019-11-11 RX ADMIN — SODIUM CHLORIDE, SODIUM LACTATE, POTASSIUM CHLORIDE, AND CALCIUM CHLORIDE 125 ML/HR: 600; 310; 30; 20 INJECTION, SOLUTION INTRAVENOUS at 14:33

## 2019-11-11 RX ADMIN — HYDROMORPHONE HYDROCHLORIDE 0.5 MG: 2 INJECTION, SOLUTION INTRAMUSCULAR; INTRAVENOUS; SUBCUTANEOUS at 12:42

## 2019-11-11 RX ADMIN — GLYCOPYRROLATE 0.4 MG: 0.2 INJECTION INTRAMUSCULAR; INTRAVENOUS at 13:06

## 2019-11-11 RX ADMIN — MIDAZOLAM 2 MG: 1 INJECTION INTRAMUSCULAR; INTRAVENOUS at 11:50

## 2019-11-11 RX ADMIN — ALBUTEROL SULFATE 3 PUFF: 90 AEROSOL, METERED RESPIRATORY (INHALATION) at 12:27

## 2019-11-11 RX ADMIN — HYDROMORPHONE HYDROCHLORIDE 0.5 MG: 2 INJECTION, SOLUTION INTRAMUSCULAR; INTRAVENOUS; SUBCUTANEOUS at 12:29

## 2019-11-11 NOTE — ANESTHESIA POSTPROCEDURE EVALUATION
Post-Anesthesia Evaluation and Assessment    Cardiovascular Function/Vital Signs  Visit Vitals  /49   Pulse 65   Temp 37 °C (98.6 °F)   Resp 15   Ht 5' 6\" (1.676 m)   Wt 76.7 kg (169 lb 3 oz)   SpO2 98%   BMI 27.31 kg/m²       Patient is status post Procedure(s):  ROBOTIC TOTAL LAPAROSCOPIC HYSTERECTOMY,BILATERAL SALPINGO-OOPHORECTOMY,. Nausea/Vomiting: Controlled. Postoperative hydration reviewed and adequate. Pain:  Pain Scale 1: Numeric (0 - 10) (11/11/19 1340)  Pain Intensity 1: 0 (11/11/19 1340)   Managed. Neurological Status:   Neuro (WDL): Within Defined Limits (11/11/19 1340)   At baseline. Mental Status and Level of Consciousness: Arousable. Pulmonary Status:   O2 Device: Nasal cannula (11/11/19 1340)   Adequate oxygenation and airway patent. Complications related to anesthesia: None    Post-anesthesia assessment completed. No concerns. Patient has met all discharge requirements.     Signed By: Joby Pearl CRNA    November 11, 2019

## 2019-11-11 NOTE — PROGRESS NOTES
Pt transported to room 325. Manisha Coronado is at bedside. Pt skin assessment performed and nothing new noted at this time. Pt dressings CDI att his time. Pt NAD at this time.      Visit Vitals  BP (P) 111/61   Pulse (P) 62   Temp (P) 97.4 °F (36.3 °C)   Resp (P) 17   Ht 5' 6\" (1.676 m)   Wt 76.7 kg (169 lb 3 oz)   SpO2 (P) 97%   BMI 27.31 kg/m²     Date 11/10/19 0700 - 11/11/19 0659(Not Admitted) 11/11/19 0700 - 11/12/19 0659   Shift 5677-1287 3913-8199 24 Hour Total 0391-8000 1327-0764 24 Hour Total   INTAKE   I.V.    1600  1600     Volume (lactated Ringers infusion)    800  800     Volume (lactated Ringers infusion)    800  800   Shift Total(mL/kg)    1600(20.8)  1600(20.8)   OUTPUT   Urine    200  200     Urine Output    100  100     Urine Output (mL) (Urinary Catheter 11/11/19 Batista)    100  100   Blood    50  50     Estimated Blood Loss    50  50   Shift Total(mL/kg)    250(3.3)  250(3.3)   NET    1350  1350   Weight (kg)    76.7 76.7 76.7

## 2019-11-11 NOTE — ANESTHESIA PREPROCEDURE EVALUATION
Relevant Problems   No relevant active problems       Anesthetic History   No history of anesthetic complications            Review of Systems / Medical History  Patient summary reviewed, nursing notes reviewed and pertinent labs reviewed    Pulmonary          Smoker         Neuro/Psych   Within defined limits           Cardiovascular  Within defined limits                     GI/Hepatic/Renal  Within defined limits              Endo/Other    Diabetes: poorly controlled         Other Findings   Comments: Last a1c on 09/23/19 = 9.5           Physical Exam    Airway  Mallampati: II  TM Distance: 4 - 6 cm  Neck ROM: normal range of motion   Mouth opening: Normal     Cardiovascular    Rhythm: regular  Rate: normal         Dental    Dentition: Caps/crowns     Pulmonary  Breath sounds clear to auscultation               Abdominal  GI exam deferred       Other Findings            Anesthetic Plan    ASA: 3  Anesthesia type: general          Induction: Intravenous  Anesthetic plan and risks discussed with: Patient and Family

## 2019-11-11 NOTE — INTERVAL H&P NOTE
H&P Update:History and physical has been reviewed. The patient has been examined. There have been no significant clinical changes since the completion of the originally dated History and Physical.Vickie Vaughan was seen and examined. History and physical has been reviewed. The patient has been examined.  There have been no significant clinical changes since the completion of the originally dated History and Physical.

## 2019-11-11 NOTE — PERIOP NOTES
TRANSFER - OUT REPORT:    Verbal report given to Lou Dixon RN (name) on Hardy Poplin  being transferred to [address] [place] [address](unit) for routine post - op       Report consisted of patients Situation, Background, Assessment and   Recommendations(SBAR). Information from the following report(s) SBAR, Intake/Output and MAR was reviewed with the receiving nurse. Lines:   Peripheral IV 11/11/19 Right Forearm (Active)   Site Assessment Clean, dry, & intact 11/11/2019  1:20 PM   Phlebitis Assessment 0 11/11/2019  1:20 PM   Infiltration Assessment 0 11/11/2019  1:20 PM   Dressing Status Clean, dry, & intact 11/11/2019  1:20 PM   Dressing Type Transparent;Tape 11/11/2019  1:20 PM   Hub Color/Line Status Green; Infusing 11/11/2019  1:20 PM       Peripheral IV 11/11/19 Left Forearm (Active)   Site Assessment Clean, dry, & intact 11/11/2019  1:20 PM   Phlebitis Assessment 0 11/11/2019  1:20 PM   Infiltration Assessment 0 11/11/2019  1:20 PM   Dressing Status Clean, dry, & intact 11/11/2019  1:20 PM   Dressing Type Transparent;Tape 11/11/2019  1:20 PM   Hub Color/Line Status Pink; Infusing 11/11/2019  1:20 PM        Opportunity for questions and clarification was provided.       Patient transported with:   O2 @ 2 liters  Registered Nurse  [facility]

## 2019-11-11 NOTE — BRIEF OP NOTE
BRIEF OPERATIVE NOTE    Date of Procedure: 11/11/2019   Preoperative Diagnosis: PELVIC MASS  Postoperative Diagnosis: PELVIC MASS    Procedure(s):  ROBOTIC TOTAL LAPAROSCOPIC HYSTERECTOMY,BILATERAL SALPINGO-OOPHORECTOMY,REsection of pelvic mass. Surgeon(s) and Role:     * Za Arcos MD - Primary         Surgical Assistant: Umu Pritchett    Surgical Staff:  Circ-1: Prudence Espino RN  Circ-Relief: Jaymie Hawkins RN  Scrub Tech-1: Mart Lovell  Surg Asst-1: Frannie VALENTE  Event Time In Time Out   Incision Start 1215    Incision Close       Anesthesia: General   Estimated Blood Loss: 50  Specimens:   ID Type Source Tests Collected by Time Destination   1 : UTERUS, CERVIX, RIGHT TUBE & OVARY Preservative Uterus  Za Arcos MD 11/11/2019 1246 Pathology   2 : LEFT TUBE & OVARY Frozen Section Ovary  Za Arcos MD 11/11/2019 1249 Pathology   1 : Abdominal washings Fresh Abdomen  Za Arcos MD 11/11/2019 1227 Cytology      Findings: 14 cm smooth walled cystic mass replacing left ovary.  Normal uterus, right tube/ovary   Complications: none  Implants: * No implants in log *

## 2019-11-11 NOTE — PROGRESS NOTES
Transition of Care (NICOL) Plan:    Physician follow up    Chart reviewed. Pt admitted for an elective surgical procedure (ROBOTIC TOTAL LAPAROSCOPIC HYSTERECTOMY,BILATERAL SALPINGO-OOPHORECTOMY). Pt is independent. Please encourage ambulation. No transition of care needs identified at this time. Anticipate pt will be medically stable for discharge within the next 24-48 hours with physician follow up. CM available to assist as needed. NICOL Transportation:   How is patient being transported at discharge? Family/Friend      When? Once cleared by physician     Is transport scheduled? N/A      Follow-up appointment and transportation:   PCP/Specialist?  See AVS for Appointment         Who is transporting to the follow-up appointment? Self/Family/Friend      Is transport for follow up appointment scheduled? N/A    Communication plan (with patient/family): Who is being called? Patient or Next of Kin? Responsible party? Patient      What number(s) is to be used? See Facesheet      What service provider is calling for [facility] services? When are they calling? Readmission Risk? (Green/Low; Yellow/Moderate; Red/High):  Green    Care Management Interventions  PCP Verified by CM: Yes  Mode of Transport at Discharge:  Other (see comment)(Family/Friend)  Transition of Care Consult (CM Consult): Discharge Planning  Health Maintenance Reviewed: Yes  Current Support Network: Family Lives Nearby  Confirm Follow Up Transport: Self  Discharge Location  Discharge Placement: Home with family assistance

## 2019-11-12 VITALS
BODY MASS INDEX: 27.19 KG/M2 | OXYGEN SATURATION: 98 % | DIASTOLIC BLOOD PRESSURE: 59 MMHG | HEART RATE: 61 BPM | HEIGHT: 66 IN | SYSTOLIC BLOOD PRESSURE: 124 MMHG | WEIGHT: 169.19 LBS | TEMPERATURE: 97.8 F | RESPIRATION RATE: 16 BRPM

## 2019-11-12 LAB
ANION GAP SERPL CALC-SCNC: 6 MMOL/L (ref 3–18)
BASOPHILS # BLD: 0 K/UL (ref 0–0.1)
BASOPHILS NFR BLD: 0 % (ref 0–2)
BUN SERPL-MCNC: 8 MG/DL (ref 7–18)
BUN/CREAT SERPL: 11 (ref 12–20)
CALCIUM SERPL-MCNC: 8.3 MG/DL (ref 8.5–10.1)
CHLORIDE SERPL-SCNC: 107 MMOL/L (ref 100–111)
CO2 SERPL-SCNC: 28 MMOL/L (ref 21–32)
CREAT SERPL-MCNC: 0.7 MG/DL (ref 0.6–1.3)
DIFFERENTIAL METHOD BLD: NORMAL
EOSINOPHIL # BLD: 0.2 K/UL (ref 0–0.4)
EOSINOPHIL NFR BLD: 2 % (ref 0–5)
ERYTHROCYTE [DISTWIDTH] IN BLOOD BY AUTOMATED COUNT: 13.4 % (ref 11.6–14.5)
GLUCOSE BLD STRIP.AUTO-MCNC: 176 MG/DL (ref 70–110)
GLUCOSE SERPL-MCNC: 150 MG/DL (ref 74–99)
HCT VFR BLD AUTO: 37.4 % (ref 35–45)
HGB BLD-MCNC: 12.5 G/DL (ref 12–16)
LYMPHOCYTES # BLD: 3 K/UL (ref 0.9–3.6)
LYMPHOCYTES NFR BLD: 33 % (ref 21–52)
MCH RBC QN AUTO: 29.1 PG (ref 24–34)
MCHC RBC AUTO-ENTMCNC: 33.4 G/DL (ref 31–37)
MCV RBC AUTO: 87 FL (ref 74–97)
MONOCYTES # BLD: 0.7 K/UL (ref 0.05–1.2)
MONOCYTES NFR BLD: 8 % (ref 3–10)
NEUTS SEG # BLD: 5.2 K/UL (ref 1.8–8)
NEUTS SEG NFR BLD: 57 % (ref 40–73)
PLATELET # BLD AUTO: 219 K/UL (ref 135–420)
PMV BLD AUTO: 10.3 FL (ref 9.2–11.8)
POTASSIUM SERPL-SCNC: 3.8 MMOL/L (ref 3.5–5.5)
RBC # BLD AUTO: 4.3 M/UL (ref 4.2–5.3)
SODIUM SERPL-SCNC: 141 MMOL/L (ref 136–145)
WBC # BLD AUTO: 9.1 K/UL (ref 4.6–13.2)

## 2019-11-12 PROCEDURE — 80048 BASIC METABOLIC PNL TOTAL CA: CPT

## 2019-11-12 PROCEDURE — 85025 COMPLETE CBC W/AUTO DIFF WBC: CPT

## 2019-11-12 PROCEDURE — 36415 COLL VENOUS BLD VENIPUNCTURE: CPT

## 2019-11-12 PROCEDURE — 82962 GLUCOSE BLOOD TEST: CPT

## 2019-11-12 PROCEDURE — 74011636637 HC RX REV CODE- 636/637: Performed by: OBSTETRICS & GYNECOLOGY

## 2019-11-12 PROCEDURE — 99218 HC RM OBSERVATION: CPT

## 2019-11-12 PROCEDURE — 74011250636 HC RX REV CODE- 250/636: Performed by: OBSTETRICS & GYNECOLOGY

## 2019-11-12 RX ORDER — IBUPROFEN 800 MG/1
800 TABLET ORAL
Qty: 60 TAB | Refills: 1 | Status: SHIPPED | OUTPATIENT
Start: 2019-11-12

## 2019-11-12 RX ADMIN — Medication 10 ML: at 00:14

## 2019-11-12 RX ADMIN — KETOROLAC TROMETHAMINE 15 MG: 15 INJECTION, SOLUTION INTRAMUSCULAR; INTRAVENOUS at 09:13

## 2019-11-12 RX ADMIN — INSULIN LISPRO 2 UNITS: 100 INJECTION, SOLUTION INTRAVENOUS; SUBCUTANEOUS at 00:11

## 2019-11-12 RX ADMIN — Medication 10 ML: at 08:36

## 2019-11-12 RX ADMIN — INSULIN LISPRO 2 UNITS: 100 INJECTION, SOLUTION INTRAVENOUS; SUBCUTANEOUS at 08:33

## 2019-11-12 RX ADMIN — ENOXAPARIN SODIUM 40 MG: 40 INJECTION, SOLUTION INTRAVENOUS; SUBCUTANEOUS at 08:34

## 2019-11-12 RX ADMIN — Medication 2 G: at 04:15

## 2019-11-12 NOTE — OP NOTES
[facility]  OPERATIVE REPORT    Name:  Ken Sanchez  MR#:   [id number]  :  1960  ACCOUNT #:  [de-identified]  DATE OF SERVICE:  2019    PREOPERATIVE DIAGNOSIS:  Pelvic mass. POSTOPERATIVE DIAGNOSIS:  Pelvic mass. PROCEDURE PERFORMED:  Robotic-assisted total laparoscopic hysterectomy, bilateral salpingo-oophorectomy, and resection of pelvic mass. SURGEON:  Mary Sutherland DO.    ASSISTANT:  Corona Kumar. ASSISTANT TASK:  Retraction, closing, and uterine manipulation. ANESTHESIA:  General.    FLUIDS:  1600. ESTIMATED BLOOD LOSS:  50. URINE OUTPUT:  100 clear urine. COMPLICATIONS:  None. IMPLANTS:  None. SPECIMENS REMOVED:  Left tube and ovary, uterus, cervix. Right tube and ovary washings. FINDINGS:  Laparoscopic findings revealed a 13- to 15-cm smooth walled cystic lesion arising from left adnexa. Slightly enlarged fibroid uterus and normal tube and ovary on the right. All peritoneal surfaces were smooth. INDICATION:  The patient is a 49-year-old who had been having left lower quadrant abdominal pain. She had a CT scan done and an ultrasound revealing a large pelvic mass. She presents today for definitive surgical management. PROCEDURE:  The patient was taken to the operating room where general anesthesia was obtained without difficulty. She was placed in dorsal lithotomy position, prepped and draped in normal sterile fashion. A surgical time-out was taken by entire surgical team.  A Batista catheter was placed. A sterile speculum was then placed in the patient's vagina. The anterior lip of the cervix was grasped with a single-tooth tenaculum. The cervix was then dilated. An 8-cm BRANDY tip with a 3.5-cm Mark Raring was advanced without difficulty. Attention was then turned to the abdomen where a Veress needle was introduced in the left upper quadrant and midclavicular line. Pneumoperitoneum was obtained to 15 mmHg.   An incision was then made approximately 2- to 3-cm above the umbilicus in the midline. An 8.5-mm robotic trocar was advanced. The laparoscope was introduced and there was no injury from entry. The patient was placed in Trendelenburg with findings above. At this point, an 8-mm robotic trocar was placed in left and right side of the abdomen under direct visualization and a 5-mm trocar was placed in the right lower quadrant under direct visualization. Washings were then obtained. The robot was then then docked at the console. Attention was then turned to the left adnexa where a medial traction and the left IP ligament was sealed and divided using bipolar and monopolar cautery. Some adhesions of the rectosigmoid epiploica were taken down off the pelvic sidewall. The broad ligament was then incised towards the uterine fundus and the left fallopian tube and uterine ligament were sealed and divided at the fundus. The specimen was placed in the upper abdomen. The left round ligament was then divided using monopolar cautery and the posteromedial leaf of the broad ligament was incised towards the [employer]. The right round ligament was identified and divided using monopolar cautery. This peritoneal incision was extended superiorly along the right IP ligament. The right retroperitoneal space was opened. The ureter was identified. A window in the peritoneum overlying the ureter was made, and the right IP ligament was skeletonized, sealed, and divided. The adnexa was elevated and the posteromedial leaf of the broad ligament was then incised towards the [employer]. The uterus was then retroverted and the anterior vesicouterine peritoneum was identified, and incised along the lower uterine segment. The bladder flap was then created with both blunt and sharp dissection. The uterine arteries were then skeletonized bilaterally, sealed, and divided. The cardinal ligaments were sealed and divided.   An anterior colpotomy was made, and this was carried circumferentially around the [facility] and the specimen was pulled through the vagina. A large EndoCatch bag was then placed through the vagina and the left ovarian mass was placed into the EndoCatch bag and removed and sent for pathologic evaluation, consistent with a benign ovarian neoplasm. The vaginal cuff was then closed with 2-0 PDS in a running fashion. The pelvis was irrigated copiously and there was good hemostasis. At this point, all instruments were removed. The robot was undocked. All trocars were removed and pneumoperitoneum was relieved. All skin sites were closed with 4-0 Monocryl and Exparel was injected at the conclusion. The patient tolerated the procedure well. Sponge, needle, and instrument counts were correct x2, and the patient was taken to recovery room in stable condition.       Chaparrita Shin DO [id number]  D:  11/11/2019 13:27  T:  11/11/2019 14:17  JOB #:  [id number]

## 2019-11-12 NOTE — PROGRESS NOTES
2014 -  Head to toe assessment performed at this time. Pt denies any chest pain or SOB. Pt denies any numbness or tingling to extremities. Pt encouraged to manage pain and to use the incentive spirometer. Pt educated on the side effects of medications taken. Pt left with call light within reach and bed in low position. Will continue to monitor. Shift summary - Pt pain managed with prn medication per MAR. Pt informed about all medications and side effects and encouraged to ask questions about medication. Pt encouraged throughout the night to use incentive spirometer and the purpose of the incentive spirometer. Pt left with no signs of distress and any concerns of pt addressed.

## 2019-11-12 NOTE — PROGRESS NOTES
Patient arrived to the floor via bed. Dual skin assessment completed. Assessment completed. Patient has no complaints of pain. Patient has ambulated twice around the [facility] nurses station. Patient is up eating dinner, with no complaints of pain. Bedside and Verbal shift change report given to REBEKAH Escoto (oncoming nurse) by Temo Maddox RN (offgoing nurse). Report included the following information SBAR, Kardex, OR Summary, Procedure Summary, Intake/Output, MAR and Recent Results.

## 2019-11-12 NOTE — DISCHARGE INSTRUCTIONS
Patient Education        Laparoscopically Assisted Vaginal Hysterectomy: What to Expect at [address] [place] [address] can expect to feel better and stronger each day, although you may need pain medicine for a week or two. You may get tired easily or have less energy than usual. This may last for several weeks after surgery. You will probably notice that your belly is swollen and puffy. This is common. The swelling will take several weeks to go down. It may take about 4 to 6 weeks to fully recover. The recovery time may be less for some patients. You may have some light vaginal bleeding. Don't have sex until the doctor says it is okay. Don't douche or put anything into your vagina, such as a tampon, until your doctor says it is okay. It is important to avoid lifting while you are recovering so that you can heal.  This care sheet gives you a general idea about how long it will take for you to recover. But each person recovers at a different pace. Follow the steps below to get better as quickly as possible. How can you care for yourself at home? Activity    · Rest when you feel tired. Getting enough sleep will help you recover.     · Try to walk each day. Start by walking a little more than you did the day before. Bit by bit, increase the amount you walk. Walking boosts blood flow and helps prevent pneumonia and constipation.     · Avoid lifting anything that would make you strain. This may include heavy grocery bags and milk containers, a heavy briefcase or backpack, cat litter or dog food bags, a vacuum , or a child.     · Avoid strenuous activities, such as biking, jogging, weight lifting, or aerobic exercise, until your doctor says it is okay.     · You may shower. Pat the cut (incision) dry. Do not take a bath for the first 2 weeks, or until your doctor tells you it is okay.     · Ask your doctor when you can drive again.     · You will probably need to take about 2 weeks off from work.  It depends on the type of work you do and how you feel.     · Your doctor will tell you when you can have sex again. Diet    · You can eat your normal diet. If your stomach is upset, try bland, low-fat foods like plain rice, broiled chicken, toast, and yogurt.     · Drink plenty of fluids (unless your doctor tells you not to).     · You may notice that your bowel movements are not regular right after your surgery. This is common. Try to avoid constipation and straining with bowel movements. You may want to take a fiber supplement every day. If you have not had a bowel movement after a couple of days, ask your doctor about taking a mild laxative. Medicines    · Your doctor will tell you if and when you can restart your medicines. He or she will also give you instructions about taking any new medicines.     · If you take blood thinners, such as warfarin (Coumadin), clopidogrel (Plavix), or aspirin, be sure to talk to your doctor. He or she will tell you if and when to start taking those medicines again. Make sure that you understand exactly what your doctor wants you to do.     · Be safe with medicines. Take pain medicines exactly as directed. ? If the doctor gave you a prescription medicine for pain, take it as prescribed. ? If you are not taking a prescription pain medicine, ask your doctor if you can take an over-the-counter medicine.     · If you think your pain medicine is making you sick to your stomach:  ? Take your medicine after meals (unless your doctor has told you not to). ? Ask your doctor for a different pain medicine.     · If your doctor prescribed antibiotics, take them as directed. Do not stop taking them just because you feel better. You need to take the full course of antibiotics. Incision care    · You may have stitches over the cuts (incisions) the doctor made in your belly. If you have strips of tape on the incisions the doctor made, leave the tape on for a week or until it falls off.  Or follow your doctor's instructions for removing the tape.     · Wash the area daily with warm, soapy water, and pat it dry. Don't use hydrogen peroxide or alcohol, which can slow healing. You may cover the area with a gauze bandage if it weeps or rubs against clothing. Change the bandage every day.     · Keep the area clean and dry. Other instructions    · You may have some light vaginal bleeding. Wear sanitary pads if needed. Do not douche or use tampons. Follow-up care is a key part of your treatment and safety. Be sure to make and go to all appointments, and call your doctor if you are having problems. It's also a good idea to know your test results and keep a list of the medicines you take. When should you call for help? Call 911 anytime you think you may need emergency care. For example, call if:    · You passed out (lost consciousness).     · You have chest pain, are short of breath, or cough up blood.    Call your doctor now or seek immediate medical care if:    · You have pain that does not get better after you take pain medicine.     · You cannot pass stools or gas.     · You have vaginal discharge that has increased in amount or smells bad.     · You are sick to your stomach or cannot drink fluids.     · You have loose stitches, or your incision comes open.     · Bright red blood has soaked through the bandage over your incision.     · You have signs of infection, such as:  ? Increased pain, swelling, warmth, or redness. ? Red streaks leading from the incision. ? Pus draining from the incision. ? A fever.     · You have bright red vaginal bleeding that soaks one or more pads in an hour, or you have large clots.     · You have signs of a blood clot in your leg (called a deep vein thrombosis), such as:  ? Pain in your calf, back of the knee, thigh, or groin. ? Redness and swelling in your leg.    Watch closely for changes in your health, and be sure to contact your doctor if you have any problems.   Where can you learn more? Go to http://sam-jackie.info/. Enter R353 in the search box to learn more about \"Laparoscopically Assisted Vaginal Hysterectomy: What to Expect at Home. \"  Current as of: February 19, 2019  Content Version: 12.2  © 8091-1769 [employer]. Care instructions adapted under license by [employer] (which disclaims liability or warranty for this information). If you have questions about a medical condition or this instruction, always ask your healthcare professional. [address] any warranty or liability for your use of this information. DISCHARGE SUMMARY from Nurse    PATIENT INSTRUCTIONS:    After general anesthesia or intravenous sedation, for 24 hours or while taking prescription Narcotics:  · Limit your activities  · Do not drive and operate hazardous machinery  · Do not make important personal or business decisions  · Do  not drink alcoholic beverages  · If you have not urinated within 8 hours after discharge, please contact your surgeon on call. Report the following to your surgeon:  · Excessive pain, swelling, redness or odor of or around the surgical area  · Temperature over 100.5  · Nausea and vomiting lasting longer than 4 hours or if unable to take medications  · Any signs of decreased circulation or nerve impairment to extremity: change in color, persistent  numbness, tingling, coldness or increase pain  · Any questions    What to do at Home:  Recommended activity: Activity as tolerated, no lifting, no driving while on narcotics, and nothing in vagina until cleared by Dr Otho Frankel    *  Please give a list of your current medications to your Primary Care Provider. *  Please update this list whenever your medications are discontinued, doses are      changed, or new medications (including over-the-counter products) are added.     *  Please carry medication information at all times in case of emergency situations. These are general instructions for a healthy lifestyle:    No smoking/ No tobacco products/ Avoid exposure to second hand smoke  Surgeon General's Warning:  Quitting smoking now greatly reduces serious risk to your health. Obesity, smoking, and sedentary lifestyle greatly increases your risk for illness    A healthy diet, regular physical exercise & weight monitoring are important for maintaining a healthy lifestyle    You may be retaining fluid if you have a history of heart failure or if you experience any of the following symptoms:  Weight gain of 3 pounds or more overnight or 5 pounds in a week, increased swelling in our hands or feet or shortness of breath while lying flat in bed. Please call your doctor as soon as you notice any of these symptoms; do not wait until your next office visit. The discharge information has been reviewed with the patient. The patient verbalized understanding. Discharge medications reviewed with the patient and appropriate educational materials and side effects teaching were provided.   ___________________________________________________________________________________________________________________________________

## 2019-11-12 NOTE — PROGRESS NOTES
7500 -   Bedside and verbal shift change report given to Teodoro Huber RN (on coming nurse) by Maribel Pickering RN (off going nurse). Report included the following information SBAR, Kardex, OR Summary, Intake/Output and MAR.      [facility], RN -AVS review with pt, opportunity for questions and concerns at this time. D/c IV clean and dry. Properly discarded armbands.

## 2019-11-12 NOTE — PROGRESS NOTES
Bedside and Verbal shift change report given to Naila Holley RN (oncoming nurse) by JUDY Child RN (offgoing nurse). Report included the following information SBAR, Kardex, Intake/Output, MAR and Recent Results.

## 2019-11-12 NOTE — PROGRESS NOTES
TRANSFER - IN REPORT:    Verbal report received from REBEKAH Swan(name) on Carlen Bumpers  being received from PACU(unit) for routine post - op      Report consisted of patients Situation, Background, Assessment and   Recommendations(SBAR). Information from the following report(s) SBAR, Kardex, OR Summary, Procedure Summary, Intake/Output and MAR was reviewed with the receiving nurse. Opportunity for questions and clarification was provided. Assessment completed upon patients arrival to unit and care assumed.

## 2019-11-12 NOTE — DISCHARGE SUMMARY
GYN Discharge Summary                        Patient ID:  Ammy Smith  62 y.o. Admission Date: 11/11/2019  Discharge Date: 11/12/2019                                                 Attending: Isac Mondragon MD   PCP: Charito Jones MD                                                                                               Reason for admission: Pelvic mass. Discharge  diagnosis: Active Problems:    Pelvic mass in female (11/11/2019)        Associated Conditions:        Patient Active Problem List   Diagnosis Code    Pelvic mass in female R19.00              Past Medical History:   Diagnosis Date    Diabetes ([facility] [address] 2019       Operative Procedure: Robotic-assisted total laparoscopic hysterectomy, bilateral salpingo-oophorectomy, and resection of pelvic mass. Complications:  none                   Transfusion:  none    Hospital Course: uncomplicated    Condition at discharge: stable, Afebrile, Ambulating and Eating, Drinking, Voiding    Labs:  Lab Results   Component Value Date/Time    WBC 9.1 11/12/2019 03:38 AM    HGB 12.5 11/12/2019 03:38 AM    HCT 37.4 11/12/2019 03:38 AM    PLATELET 242 04/80/5935 03:38 AM    MCV 87.0 11/12/2019 03:38 AM     Lab Results   Component Value Date/Time    Sodium 141 11/12/2019 03:38 AM    Potassium 3.8 11/12/2019 03:38 AM    Chloride 107 11/12/2019 03:38 AM    CO2 28 11/12/2019 03:38 AM    Anion gap 6 11/12/2019 03:38 AM    Glucose 150 (H) 11/12/2019 03:38 AM    BUN 8 11/12/2019 03:38 AM    Creatinine 0.70 11/12/2019 03:38 AM    BUN/Creatinine ratio 11 (L) 11/12/2019 03:38 AM    GFR est AA >60 11/12/2019 03:38 AM    GFR est non-AA >60 11/12/2019 03:38 AM         Cannot display discharge medications since this patient is not currently admitted. Patient Instructions: I have reviewed discharge instructions with the patient. The patient verbalized understanding.          Disposition: Home    Follow-up:  Follow up with Dr. Nikkie Mancuso  in 2 weeks    Author:   Cierra Cullen NP  Gynecologic Oncology  11/12/2019  9:55 AM

## 2019-11-16 LAB
ABO + RH BLD: NORMAL
BLD PROD TYP BPU: NORMAL
BLD PROD TYP BPU: NORMAL
BLOOD GROUP ANTIBODIES SERPL: NORMAL
BLOOD GROUP ANTIBODIES SERPL: NORMAL
BPU ID: NORMAL
BPU ID: NORMAL
CROSSMATCH RESULT,%XM: NORMAL
CROSSMATCH RESULT,%XM: NORMAL
SPECIMEN EXP DATE BLD: NORMAL
STATUS OF UNIT,%ST: NORMAL
STATUS OF UNIT,%ST: NORMAL
UNIT DIVISION, %UDIV: 0
UNIT DIVISION, %UDIV: 0

## 2019-11-18 ENCOUNTER — TELEPHONE (OUTPATIENT)
Dept: ONCOLOGY | Age: 59
End: 2019-11-18

## 2019-11-18 NOTE — TELEPHONE ENCOUNTER
Patient contacted the office regarding her steri-strips from surgery last week. Relayed that we expect them to come off around 1 week after surgery, but it is not concerning if they stay on longer. Patient can use a soapy hand to wash the area to encourage them to loosen but does not need to pull them off. She was agreeable with this and had no further questions at this time.

## 2019-11-25 NOTE — PROGRESS NOTES
[facility]  [address] [place] [address], [address], [address] [facility]  [address] [facility], [address]  [place], [address]  [phone]  Queenie Rangel DO      Postoperative Office Note  Patient ID:  Name: Dominick Taylor  MRM: [id number]  : 59 y.o. Date: 2019    SUBJECTIVE:    This is a 62 y.o.  female who presents s/p Robotic-assisted total laparoscopic hysterectomy, bilateral salpingo-oophorectomy, and resection of pelvic mass on 2019  Currently she has no problems with eating, bowel movements, voiding, or their wound  Appetite is good. Eating a regular diet without difficulty. Urinating without difficulty. Bowel movements are constipated. The patient is not having any pain. .    Her pathology revealed      A: LEFT OVARY AND FALLOPIAN TUBE:   BENIGN MUCINOUS CYSTADENOMA OF OVARY. B: UTERUS, RIGHT OVARY AND FALLOPIAN TUBE:   LEIOMYOMAS OF UTERUS; SUPERFICIAL ADENOMYOSIS; CYSTIC ATROPHY OF ENDOMETRIUM. Medications:     Current Outpatient Medications on File Prior to Visit   Medication Sig Dispense Refill    SITagliptin (JANUVIA) 100 mg tablet Take 100 mg by mouth.  mv-mn/folic ac/calcium/vit K1 (WOMEN'S 50 PLUS MULTIVITAMIN PO) Take  by mouth.  cholecalciferol, VITAMIN D3, (VITAMIN D3) 5,000 unit tab tablet Indications: Rx      ibuprofen (MOTRIN) 800 mg tablet Take 1 Tab by mouth every six (6) hours as needed for Pain. 60 Tab 1     No current facility-administered medications on file prior to visit. Allergies:      Allergies   Allergen Reactions    Codeine Other (comments)    Statins-Hmg-Coa Reductase Inhibitors Other (comments)     Muscle Pain       OBJECTIVE:    Vitals:   Visit Vitals  /72 (BP 1 Location: Right arm, BP Patient Position: Sitting)   Pulse 87   Temp 96.7 °F (35.9 °C) (Oral)   Ht 5' 6\" (1.676 m)   Wt 75.9 kg (167 lb 4.8 oz)   SpO2 99%   BMI 27.00 kg/m²       Physical Examination:    General:  alert, cooperative, no distress   Abdomen: soft, bowel sounds active, non-tender   Incision: healing well   Pelvic: NEFG, Spec exam revealed vaginal cuff intact, BME revealed vaginal cuff intact, nontender   Rectal: not done   Extremity:   extremities normal, atraumatic, no cyanosis or edema     IMPRESSION/PLAN:    Guillaume Flores is Doing well postoperatively. .  She has a working diagnosis of benign mucinous cystadenoma. The operative procedures and clinical results have been reviewed with the patient. Implications of diagnosis discussed at length. All questions answered. I will see the patient back in 2 week(s). The patient is advised to call our office with any problems or concerns.     Garry Tovar DO  Gynecologic Oncology  11/26/2019/12:34 PM

## 2019-11-26 ENCOUNTER — OFFICE VISIT (OUTPATIENT)
Dept: ONCOLOGY | Age: 59
End: 2019-11-26

## 2019-11-26 VITALS
DIASTOLIC BLOOD PRESSURE: 72 MMHG | OXYGEN SATURATION: 99 % | TEMPERATURE: 96.7 F | HEIGHT: 66 IN | HEART RATE: 87 BPM | BODY MASS INDEX: 26.89 KG/M2 | SYSTOLIC BLOOD PRESSURE: 144 MMHG | WEIGHT: 167.3 LBS

## 2019-11-26 DIAGNOSIS — D27.0: Primary | ICD-10-CM

## 2019-11-26 NOTE — LETTER
11/26/19 Patient: Arabella Urrutia YOB: 1960 Date of Visit: 11/26/2019 Scott Acevedo MD 
[address] [place] [address] Suite [facility] [phone] VIA Facsimile: [phone] Dear Scott Acevedo MD, Thank you for referring Ms. Arabella Urrutia to [facility] for evaluation. My notes for this consultation are attached. If you have questions, please do not hesitate to call me. I look forward to following your patient along with you. Sincerely, Heather Woodall MD

## 2019-11-26 NOTE — PROGRESS NOTES
1. Have you been to the ER, urgent care clinic since your last visit? Hospitalized since your last visit? No    2. Have you seen or consulted any other health care providers outside of the [address] [place] [address] since your last visit? Include any pap smears or colon screening.  No        Chief Complaint   Patient presents with    Follow-up     2 week post op, no concerns

## 2019-11-26 NOTE — PATIENT INSTRUCTIONS
Noncancerous Ovarian Growths: Care Instructions  Your Care Instructions    Ovarian growths are abnormal growths in or on the ovaries. The growth can be a cyst, which is a fluid-filled sac, or a mass (neoplasm), which is a more solid growth. Most of these growths are not cancerous (benign) and don't cause symptoms. If you have symptoms, they may include pain in the belly or pelvis, pain during your period, and abnormal bleeding. Your doctor has examined you, and you have a noncancerous ovarian growth. Women and their doctors often choose to watch these types of growths closely over time but not to treat them. This is called watchful waiting. Your doctor may prescribe medicine to help with any symptoms. In some cases, noncancerous growths may need to be removed using surgery. Follow-up care is a key part of your treatment and safety. Be sure to make and go to all appointments, and call your doctor if you are having problems. It's also a good idea to know your test results and keep a list of the medicines you take. How can you care for yourself at home? · Use heat to relax tense muscles and relieve cramping. You can use a hot water bottle, a heating pad set on low, or a warm bath. · Be safe with medicines. Take pain medicines exactly as directed. ? If the doctor gave you a prescription medicine for pain, take it as prescribed. ? If you are not taking a prescription pain medicine, ask your doctor if you can take an over-the-counter medicine. · Avoid constipation. Make sure you drink enough fluids and include fruits, vegetables, and fiber in your diet each day. Constipation does not cause ovarian cysts, but it may make your pelvic pain worse. When should you call for help?   Call your doctor now or seek immediate medical care if:    · You have severe vaginal bleeding.     · You have new or worse belly or pelvic pain.    Watch closely for changes in your health, and be sure to contact your doctor if:    · You have unusual vaginal bleeding.     · You do not get better as expected. Where can you learn more? Go to http://sam-jackie.info/. Enter [phone] in the search box to learn more about \"Noncancerous Ovarian Growths: Care Instructions. \"  Current as of: February 19, 2019  Content Version: 12.2  © 7665-8684 [employer]. Care instructions adapted under license by [employer] (which disclaims liability or warranty for this information). If you have questions about a medical condition or this instruction, always ask your healthcare professional. [place] [address] any warranty or liability for your use of this information.

## 2019-12-12 NOTE — PROGRESS NOTES
[facility]  [address] [place] [address], [address], [address] [facility]  [address] [facility], [address] [facility], [address] [facility] [phone]  Garry Tovar DO      Postoperative Office Note  Patient ID:  Name: Guillaume Flores  MRM: [id number]  : 59 y.o. Date: 2019    SUBJECTIVE:    This is a 62 y.o.  female who presents s/p Robotic-assisted total laparoscopic hysterectomy, bilateral salpingo-oophorectomy, and resection of pelvic mass on 2019  Currently she has no problems with eating, bowel movements, voiding, or their wound  Appetite is good. Eating a regular diet without difficulty. Urinating without difficulty. Bowel movements are constipated. The patient is not having any pain. Had one episode of spotting since last visit. Her pathology revealed      A: LEFT OVARY AND FALLOPIAN TUBE:   BENIGN MUCINOUS CYSTADENOMA OF OVARY. B: UTERUS, RIGHT OVARY AND FALLOPIAN TUBE:   LEIOMYOMAS OF UTERUS; SUPERFICIAL ADENOMYOSIS; CYSTIC ATROPHY OF ENDOMETRIUM. Medications:     Current Outpatient Medications on File Prior to Visit   Medication Sig Dispense Refill    SITagliptin (JANUVIA) 100 mg tablet Take 100 mg by mouth.  mv-mn/folic ac/calcium/vit K1 (WOMEN'S 50 PLUS MULTIVITAMIN PO) Take  by mouth.  cholecalciferol, VITAMIN D3, (VITAMIN D3) 5,000 unit tab tablet Indications: Rx      ibuprofen (MOTRIN) 800 mg tablet Take 1 Tab by mouth every six (6) hours as needed for Pain. 60 Tab 1     No current facility-administered medications on file prior to visit. Allergies:      Allergies   Allergen Reactions    Codeine Other (comments)    Statins-Hmg-Coa Reductase Inhibitors Other (comments)     Muscle Pain       OBJECTIVE:    Vitals:   Visit Vitals  /72 (BP 1 Location: Right arm, BP Patient Position: Sitting)   Pulse 83   Temp 95.7 °F (35.4 °C) (Oral)   Resp 12   Ht 5' 6\" (1.676 m)   Wt 76.2 kg (168 lb)   SpO2 98%   BMI 27.12 kg/m²       Physical Examination:    General:  alert, cooperative, no distress   Abdomen: soft, bowel sounds active, non-tender   Incision: healing well. One stitch removed   Pelvic: NEFG, Spec exam revealed vaginal cuff intact, BME revealed vaginal cuff intact, nontender   Rectal: not done   Extremity:   extremities normal, atraumatic, no cyanosis or edema     IMPRESSION/PLAN:    Gala Kilpatrick is Doing well postoperatively. .  She has a working diagnosis of benign mucinous cystadenoma. The operative procedures and clinical results have been reviewed with the patient. Implications of diagnosis discussed at length. All questions answered. I will see the patient back prn The patient is advised to call our office with any problems or concerns.     Ashley Jimenez DO  Gynecologic Oncology  12/13/2019/12:34 PM

## 2019-12-13 ENCOUNTER — OFFICE VISIT (OUTPATIENT)
Dept: ONCOLOGY | Age: 59
End: 2019-12-13

## 2019-12-13 VITALS
RESPIRATION RATE: 12 BRPM | HEIGHT: 66 IN | OXYGEN SATURATION: 98 % | BODY MASS INDEX: 27 KG/M2 | SYSTOLIC BLOOD PRESSURE: 134 MMHG | DIASTOLIC BLOOD PRESSURE: 72 MMHG | HEART RATE: 83 BPM | TEMPERATURE: 95.7 F | WEIGHT: 168 LBS

## 2019-12-13 DIAGNOSIS — D27.0: Primary | ICD-10-CM

## 2019-12-13 NOTE — LETTER
12/13/19 Patient: Arabella Urrutia YOB: 1960 Date of Visit: 12/13/2019 Scott Acevedo MD 
[address] [place] [address] [address] [place] [address] VIA Facsimile: [phone] Dear Scott Acevedo MD, Thank you for referring Ms. Arabella Urrutia to [facility] for evaluation. My notes for this consultation are attached. If you have questions, please do not hesitate to call me. I look forward to following your patient along with you. Sincerely, Heather Woodall MD

## 2019-12-13 NOTE — PATIENT INSTRUCTIONS
Noncancerous Ovarian Growths: Care Instructions  Your Care Instructions    Ovarian growths are abnormal growths in or on the ovaries. The growth can be a cyst, which is a fluid-filled sac, or a mass (neoplasm), which is a more solid growth. Most of these growths are not cancerous (benign) and don't cause symptoms. If you have symptoms, they may include pain in the belly or pelvis, pain during your period, and abnormal bleeding. Your doctor has examined you, and you have a noncancerous ovarian growth. Women and their doctors often choose to watch these types of growths closely over time but not to treat them. This is called watchful waiting. Your doctor may prescribe medicine to help with any symptoms. In some cases, noncancerous growths may need to be removed using surgery. Follow-up care is a key part of your treatment and safety. Be sure to make and go to all appointments, and call your doctor if you are having problems. It's also a good idea to know your test results and keep a list of the medicines you take. How can you care for yourself at home? · Use heat to relax tense muscles and relieve cramping. You can use a hot water bottle, a heating pad set on low, or a warm bath. · Be safe with medicines. Take pain medicines exactly as directed. ? If the doctor gave you a prescription medicine for pain, take it as prescribed. ? If you are not taking a prescription pain medicine, ask your doctor if you can take an over-the-counter medicine. · Avoid constipation. Make sure you drink enough fluids and include fruits, vegetables, and fiber in your diet each day. Constipation does not cause ovarian cysts, but it may make your pelvic pain worse. When should you call for help?   Call your doctor now or seek immediate medical care if:    · You have severe vaginal bleeding.     · You have new or worse belly or pelvic pain.    Watch closely for changes in your health, and be sure to contact your doctor if:    · You have unusual vaginal bleeding.     · You do not get better as expected. Where can you learn more? Go to http://sam-jackie.info/. Enter [phone] in the search box to learn more about \"Noncancerous Ovarian Growths: Care Instructions. \"  Current as of: February 19, 2019  Content Version: 12.2  © 4355-0071 [employer]. Care instructions adapted under license by [employer] (which disclaims liability or warranty for this information). If you have questions about a medical condition or this instruction, always ask your healthcare professional. [place] [address] any warranty or liability for your use of this information.

## 2019-12-13 NOTE — PROGRESS NOTES
Ritchie Ulloa is a 62 y.o. female presents in office for surgical follow up. Chief Complaint   Patient presents with    Surgical Follow-up     [facility], BSO        Do you have any unusual vaginal bleeding, discharge or irritation? No  Do you have any changes in your bowel movements? No  Have you been experiencing nausea or vomiting? No  Have you been experiencing any continuous or worsening abdominal pain? No  Any urinary burning? No    Visit Vitals  /72 (BP 1 Location: Right arm, BP Patient Position: Sitting)   Pulse 83   Temp 95.7 °F (35.4 °C) (Oral)   Resp 12   Ht 5' 6\" (1.676 m)   Wt 76.2 kg (168 lb)   SpO2 98%   BMI 27.12 kg/m²         Health Maintenance Due   Topic Date Due    Hepatitis C Screening  1960    Pneumococcal 0-64 years (1 of 1 - PPSV23) 05/11/1964    PAP AKA CERVICAL CYTOLOGY  05/11/1979    Shingrix Vaccine Age 50> (1 of 2) 05/11/2008    BREAST CANCER SCRN MAMMOGRAM  05/11/2008    FOBT Q 1 YEAR AGE 50-75  05/11/2008    Influenza Age 9 to Adult  08/01/2019         1. Have you been to the ER, urgent care clinic since your last visit? Hospitalized since your last visit? No    2. Have you seen or consulted any other health care providers outside of the [address] [place] [address] since your last visit? Include any pap smears or colon screening. No    3 most recent PHQ Screens 11/26/2019   Little interest or pleasure in doing things Not at all   Feeling down, depressed, irritable, or hopeless Not at all   Total Score PHQ 2 0       Abuse Screening Questionnaire 11/5/2019   Do you ever feel afraid of your partner? N   Are you in a relationship with someone who physically or mentally threatens you? N   Is it safe for you to go home? Y       Fall Risk Assessment, last 12 mths 11/5/2019   Able to walk? Yes   Fall in past 12 months?  No       Learning Assessment 11/5/2019   PRIMARY LEARNER Patient   HIGHEST LEVEL OF EDUCATION - PRIMARY LEARNER  4 YEARS OF COLLEGE   BARRIERS PRIMARY LEARNER NONE CO-LEARNER CAREGIVER No   PRIMARY LANGUAGE ENGLISH   LEARNER PREFERENCE PRIMARY DEMONSTRATION     READING     LISTENING   ANSWERED BY patient   RELATIONSHIP SELF

## 2022-03-19 PROBLEM — R19.00 PELVIC MASS IN FEMALE: Status: ACTIVE | Noted: 2019-11-11

## 2024-09-12 ENCOUNTER — HOSPITAL ENCOUNTER (OUTPATIENT)
Facility: HOSPITAL | Age: 64
Setting detail: OUTPATIENT SURGERY
Discharge: HOME | End: 2024-09-12
Attending: INTERNAL MEDICINE | Admitting: INTERNAL MEDICINE
Payer: MEDICARE

## 2024-09-12 ENCOUNTER — ANESTHESIA (OUTPATIENT)
Facility: HOSPITAL | Age: 64
End: 2024-09-12
Payer: MEDICARE

## 2024-09-12 ENCOUNTER — ANESTHESIA EVENT (OUTPATIENT)
Facility: HOSPITAL | Age: 64
End: 2024-09-12
Payer: MEDICARE

## 2024-09-12 VITALS
SYSTOLIC BLOOD PRESSURE: 116 MMHG | HEART RATE: 61 BPM | WEIGHT: 197.4 LBS | DIASTOLIC BLOOD PRESSURE: 44 MMHG | HEIGHT: 65 IN | TEMPERATURE: 98 F | BODY MASS INDEX: 32.89 KG/M2 | OXYGEN SATURATION: 100 % | RESPIRATION RATE: 16 BRPM

## 2024-09-12 LAB — GLUCOSE BLD STRIP.AUTO-MCNC: 119 MG/DL (ref 70–110)

## 2024-09-12 PROCEDURE — 2500000003 HC RX 250 WO HCPCS: Performed by: NURSE ANESTHETIST, CERTIFIED REGISTERED

## 2024-09-12 PROCEDURE — 88305 TISSUE EXAM BY PATHOLOGIST: CPT

## 2024-09-12 PROCEDURE — 2709999900 HC NON-CHARGEABLE SUPPLY: Performed by: INTERNAL MEDICINE

## 2024-09-12 PROCEDURE — 3700000000 HC ANESTHESIA ATTENDED CARE: Performed by: INTERNAL MEDICINE

## 2024-09-12 PROCEDURE — 3600007502: Performed by: INTERNAL MEDICINE

## 2024-09-12 PROCEDURE — 3700000001 HC ADD 15 MINUTES (ANESTHESIA): Performed by: INTERNAL MEDICINE

## 2024-09-12 PROCEDURE — 2580000003 HC RX 258: Performed by: INTERNAL MEDICINE

## 2024-09-12 PROCEDURE — 6360000002 HC RX W HCPCS: Performed by: NURSE ANESTHETIST, CERTIFIED REGISTERED

## 2024-09-12 PROCEDURE — 82962 GLUCOSE BLOOD TEST: CPT

## 2024-09-12 PROCEDURE — 7100000011 HC PHASE II RECOVERY - ADDTL 15 MIN: Performed by: INTERNAL MEDICINE

## 2024-09-12 PROCEDURE — 3600007512: Performed by: INTERNAL MEDICINE

## 2024-09-12 PROCEDURE — 7100000010 HC PHASE II RECOVERY - FIRST 15 MIN: Performed by: INTERNAL MEDICINE

## 2024-09-12 RX ORDER — PROPOFOL 10 MG/ML
INJECTION, EMULSION INTRAVENOUS
Status: DISCONTINUED | OUTPATIENT
Start: 2024-09-12 | End: 2024-09-12 | Stop reason: SDUPTHER

## 2024-09-12 RX ORDER — LIDOCAINE HYDROCHLORIDE 20 MG/ML
INJECTION, SOLUTION EPIDURAL; INFILTRATION; INTRACAUDAL; PERINEURAL
Status: DISCONTINUED | OUTPATIENT
Start: 2024-09-12 | End: 2024-09-12 | Stop reason: SDUPTHER

## 2024-09-12 RX ORDER — SODIUM CHLORIDE 9 MG/ML
INJECTION, SOLUTION INTRAVENOUS CONTINUOUS
Status: DISCONTINUED | OUTPATIENT
Start: 2024-09-12 | End: 2024-09-12 | Stop reason: HOSPADM

## 2024-09-12 RX ORDER — LEVOTHYROXINE SODIUM 25 UG/1
25 TABLET ORAL DAILY
COMMUNITY
Start: 2024-08-21

## 2024-09-12 RX ORDER — PIOGLITAZONEHYDROCHLORIDE 45 MG/1
45 TABLET ORAL DAILY
COMMUNITY
Start: 2024-05-01

## 2024-09-12 RX ORDER — TIRZEPATIDE 2.5 MG/.5ML
5 INJECTION, SOLUTION SUBCUTANEOUS WEEKLY
COMMUNITY
Start: 2024-08-01

## 2024-09-12 RX ORDER — SENNOSIDES 8.6 MG
650 CAPSULE ORAL EVERY 8 HOURS PRN
COMMUNITY

## 2024-09-12 RX ADMIN — LIDOCAINE HYDROCHLORIDE 60 MG: 20 INJECTION, SOLUTION EPIDURAL; INFILTRATION; INTRACAUDAL; PERINEURAL at 12:54

## 2024-09-12 RX ADMIN — PROPOFOL 100 MG: 10 INJECTION, EMULSION INTRAVENOUS at 12:54

## 2024-09-12 RX ADMIN — SODIUM CHLORIDE: 9 INJECTION, SOLUTION INTRAVENOUS at 12:43

## 2024-09-12 RX ADMIN — PROPOFOL 50 MG: 10 INJECTION, EMULSION INTRAVENOUS at 12:58

## 2024-09-12 RX ADMIN — PROPOFOL 30 MG: 10 INJECTION, EMULSION INTRAVENOUS at 13:05

## 2024-09-12 ASSESSMENT — LIFESTYLE VARIABLES: SMOKING_STATUS: 0

## 2024-09-12 ASSESSMENT — PAIN - FUNCTIONAL ASSESSMENT
PAIN_FUNCTIONAL_ASSESSMENT: 0-10

## (undated) DEVICE — TOTAL TRAY, DB, 100% SILI FOLEY, 16FR 10: Brand: MEDLINE

## (undated) DEVICE — CATHETER PH SUCT 14FR

## (undated) DEVICE — SYRINGE MED 5ML STD CLR PLAS LUERLOCK TIP N CTRL DISP

## (undated) DEVICE — SEAL CANN F/12MM 8.5-13MM DISP -- DA VINCI

## (undated) DEVICE — SUTURE PDS II SZ 2-0 L27IN ABSRB VLT L36MM CT-1 1/2 CIR Z339H

## (undated) DEVICE — BASIC SINGLE BASIN 1-LF: Brand: MEDLINE INDUSTRIES, INC.

## (undated) DEVICE — 40595 XL TRENDELENBURG POSITIONING KIT: Brand: 40595 XL TRENDELENBURG POSITIONING KIT

## (undated) DEVICE — DISPOSABLE SUCTION/IRRIGATOR TUBE SET WITH TIP: Brand: AHTO

## (undated) DEVICE — AIRSEAL 5 MM ACCESS PORT AND LOW PROFILE OBTURATOR WITH BLADELESS OPTICAL TIP, 120 MM LENGTH: Brand: AIRSEAL

## (undated) DEVICE — OBTRTR BLDELSS 8MM DISP -- DA VINCI - SNGL USE

## (undated) DEVICE — (D)PREP SKN CHLRAPRP APPL 26ML -- CONVERT TO ITEM 371833

## (undated) DEVICE — INTENDED FOR TISSUE SEPARATION, AND OTHER PROCEDURES THAT REQUIRE A SHARP SURGICAL BLADE TO PUNCTURE OR CUT.: Brand: BARD-PARKER ® CARBON RIB-BACK BLADES

## (undated) DEVICE — TOURNIQUET PHLEB W1XL18IN BLU FLAT RL AND BND REUSE FOR IV

## (undated) DEVICE — 4-PORT MANIFOLD: Brand: NEPTUNE 2

## (undated) DEVICE — SOLUTION IV 1000ML 20MEQ K CHL IN 0.9% SOD CHL FLX CONT

## (undated) DEVICE — TRI-LUMEN FILTERED TUBE SET WITH ACTIVATED CHARCOAL FILTER: Brand: AIRSEAL

## (undated) DEVICE — MEDI-VAC NON-CONDUCTIVE SUCTION TUBING: Brand: CARDINAL HEALTH

## (undated) DEVICE — BAG SPEC RETRV 275ML 10ML DISPOSABLE RELIACATCH

## (undated) DEVICE — STERILE POLYISOPRENE POWDER-FREE SURGICAL GLOVES: Brand: PROTEXIS

## (undated) DEVICE — Device

## (undated) DEVICE — TRAP MUCUS SPECIMEN 40ML -- MEDICHOICE

## (undated) DEVICE — GARMENT,MEDLINE,DVT,INT,CALF,MED, GEN2: Brand: MEDLINE

## (undated) DEVICE — SNARE POLYP SM W13MMXL240CM SHTH DIA2.4MM OVL FLX DISP

## (undated) DEVICE — SYSTEM ES CUP DIA3CM PNEUMO OCCL BLLN DISP FOR CLIN POS

## (undated) DEVICE — SYSTEM ES CUP DIA3.5CM PNEUMO OCCL BLLN DISP FOR CLIN POS

## (undated) DEVICE — SYRINGE 50ML E/T

## (undated) DEVICE — TUBING, SUCTION, 1/4" X 12', STRAIGHT: Brand: MEDLINE

## (undated) DEVICE — CATHETER IV 22GA L1IN BLU POLYUR STR HUB RADPQ PROTCT +

## (undated) DEVICE — KIT,ANTI FOG,W/SPONGE & FLUID,SOFT PACK: Brand: MEDLINE

## (undated) DEVICE — DRAPE,UTILITY,XL,4/PK,STERILE: Brand: MEDLINE

## (undated) DEVICE — DRAPE TWL SURG 16X26IN BLU ORB04] ALLCARE INC]

## (undated) DEVICE — BLUNTFILL: Brand: MONOJECT

## (undated) DEVICE — SUT MONOCRYL PLUS UD 4-0 --

## (undated) DEVICE — TIP COVER ACCESSORY

## (undated) DEVICE — ROBOTIC PACK: Brand: MEDLINE INDUSTRIES, INC.

## (undated) DEVICE — NEEDLE HYPO 21GA L1.5IN GRN POLYPR HUB S STL THN WALL IV

## (undated) DEVICE — KIT DRP 3 ARM ACC DISP ENDOWRIST DA VINCI SI

## (undated) DEVICE — SOLUTION LACTATED RINGERS INJECTION USP

## (undated) DEVICE — KENDALL RADIOLUCENT FOAM MONITORING ELECTRODE RECTANGULAR SHAPE: Brand: KENDALL

## (undated) DEVICE — WRISTBAND ID AD W2.5XL9.5CM RED VYN ADH CLSR UNI-PRINT

## (undated) DEVICE — TIP IU L8CM DIA6.7MM BLU SIL FLX DISP RUMI II

## (undated) DEVICE — VISUALIZATION SYSTEM: Brand: CLEARIFY

## (undated) DEVICE — CANNULA CUSH AD W/ 14FT TBG

## (undated) DEVICE — MEDI-VAC SUCTION HANDLE REGULAR CAPACITY: Brand: CARDINAL HEALTH

## (undated) DEVICE — CORD BPLR L12FT DISP

## (undated) DEVICE — REM POLYHESIVE ADULT PATIENT RETURN ELECTRODE: Brand: VALLEYLAB

## (undated) DEVICE — STERILE POLYISOPRENE POWDER-FREE SURGICAL GLOVES WITH EMOLLIENT COATING: Brand: PROTEXIS

## (undated) DEVICE — SOL IRRIGATION INJ NACL 0.9% 500ML BTL

## (undated) DEVICE — SYRINGE MEDICAL 3ML CLEAR PLASTIC STANDARD NON CONTROL LUERLOCK TIP DISPOSABLE